# Patient Record
Sex: FEMALE | Race: WHITE | Employment: UNEMPLOYED | URBAN - METROPOLITAN AREA
[De-identification: names, ages, dates, MRNs, and addresses within clinical notes are randomized per-mention and may not be internally consistent; named-entity substitution may affect disease eponyms.]

---

## 2017-01-26 ENCOUNTER — HOSPITAL ENCOUNTER (EMERGENCY)
Age: 32
Discharge: HOME | End: 2017-01-26
Attending: EMERGENCY MEDICINE

## 2017-01-26 VITALS
BODY MASS INDEX: 25.27 KG/M2 | DIASTOLIC BLOOD PRESSURE: 70 MMHG | RESPIRATION RATE: 18 BRPM | HEART RATE: 99 BPM | HEIGHT: 64 IN | WEIGHT: 148 LBS | TEMPERATURE: 98.2 F | SYSTOLIC BLOOD PRESSURE: 118 MMHG | OXYGEN SATURATION: 98 %

## 2017-01-26 DIAGNOSIS — M54.2 NECK PAIN: Primary | ICD-10-CM

## 2017-01-26 DIAGNOSIS — F41.1 ANXIETY STATE: ICD-10-CM

## 2017-01-26 LAB
ATRIAL RATE: 78 BPM
CALCULATED P AXIS, ECG09: 40 DEGREES
CALCULATED R AXIS, ECG10: 35 DEGREES
CALCULATED T AXIS, ECG11: 51 DEGREES
DIAGNOSIS, 93000: NORMAL
P-R INTERVAL, ECG05: 144 MS
Q-T INTERVAL, ECG07: 370 MS
QRS DURATION, ECG06: 82 MS
QTC CALCULATION (BEZET), ECG08: 421 MS
VENTRICULAR RATE, ECG03: 78 BPM

## 2017-01-26 RX ORDER — DICLOFENAC POTASSIUM 50 MG/1
50 TABLET, FILM COATED ORAL 3 TIMES DAILY
Qty: 50 TAB | Refills: 0 | Status: SHIPPED | OUTPATIENT
Start: 2017-01-26 | End: 2017-03-24 | Stop reason: ALTCHOICE

## 2017-01-26 RX ORDER — METHOCARBAMOL 500 MG/1
500 TABLET, FILM COATED ORAL 3 TIMES DAILY
Qty: 20 TAB | Refills: 0 | Status: SHIPPED | OUTPATIENT
Start: 2017-01-26 | End: 2017-03-24 | Stop reason: ALTCHOICE

## 2017-01-26 NOTE — UC PROVIDER NOTE
HPI Comments: 37 yo female presenting to the [facility] today with C/O ongoing chest (upper) discomfort since [place]. Today she reports right side neck pain and discomfort. Denies SOB or diaphoresis. Reports increased stress as both children are sick for the past month. Patient is anxious and tearful at times. + hx of GERD. Has been seen in the ER x 1, cardiology x 1 and GI all in the last 6 weeks. Patient is a 36 y.o. female presenting with chest pain. The history is provided by the patient. No  was used. Chest Pain    This is a chronic problem. The current episode started more than 1 week ago. The pain is at a severity of 6/10. Past Medical History   Diagnosis Date    Anemia NEC      not during pregnancy    Gastrointestinal disorder      abdominal pain    Heart abnormalities      svt    HX OTHER MEDICAL      Left shoulder abscess 11/8/12    HX OTHER MEDICAL      tachycardia SVT    HX OTHER MEDICAL      MRSA Nov 12, left shoulder.     HX OTHER MEDICAL      LGA    Migraine     Other ill-defined conditions(799.89)      rapid heart rate     SVT (supraventricular tachycardia) 9/16/2014    Thyroid disease      mild hypothyroidism        Past Surgical History   Procedure Laterality Date    Hx cholecystectomy      Hx other surgical       D&C    Hx other surgical       lap arsen March 10    Upper gi endoscopy,biopsy  11/24/2015          Colonoscopy,biopsy  5/26/2016          Colonoscopy N/A 5/26/2016     COLONOSCOPY / BIOPSY  performed by Erik Opitz, MD at [facility] ENDOSCOPY         Family History   Problem Relation Age of Onset    Asthma Mother     SLE Mother     Migraines Father     No Known Problems Brother     No Known Problems Brother     No Known Problems Brother     No Known Problems Daughter     No Known Problems Son     Cancer Paternal Aunt     Stroke Maternal Grandmother     Diabetes Paternal Grandfather         Social History     Social History    Marital status:      Spouse name: N/A    Number of children: N/A    Years of education: N/A     Occupational History    Not on file. Social History Main Topics    Smoking status: Never Smoker    Smokeless tobacco: Never Used    Alcohol use 0.0 oz/week     0 Standard drinks or equivalent per week      Comment: social    Drug use: No    Sexual activity: Yes     Birth control/ protection: None     Other Topics Concern    Not on file     Social History Narrative    ** Merged History Encounter **                     ALLERGIES: Latex; Latex; and Morphine    Review of Systems   Constitutional: Negative. HENT: Negative. Eyes: Negative. Respiratory: Negative. Cardiovascular: Positive for chest pain. Gastrointestinal: Negative. Endocrine: Negative. Genitourinary: Negative. Musculoskeletal: Positive for neck pain. Skin: Negative. Allergic/Immunologic: Negative. Neurological: Negative. Hematological: Negative. Psychiatric/Behavioral: Negative. Vitals:    01/26/17 1045   BP: 118/70   Pulse: 99   Resp: 18   Temp: 98.2 °F (36.8 °C)   SpO2: 98%   Weight: 67.1 kg (148 lb)   Height: 5' 4\" (1.626 m)       Physical Exam   Constitutional: She is oriented to person, place, and time. She appears well-developed and well-nourished. HENT:   Head: Normocephalic and atraumatic. Right Ear: External ear normal.   Left Ear: External ear normal.   Nose: Nose normal.   Mouth/Throat: Oropharynx is clear and moist.   Eyes: Conjunctivae and EOM are normal. Pupils are equal, round, and reactive to light. Neck: Normal range of motion. Neck supple. Cardiovascular: Normal rate, regular rhythm and normal heart sounds. Pulmonary/Chest: Effort normal and breath sounds normal.   Musculoskeletal: Normal range of motion. She exhibits tenderness. She exhibits no edema. + tenderness to right trapezius  No bony crepitus  No contusions     Lymphadenopathy:     She has no cervical adenopathy. Neurological: She is alert and oriented to person, place, and time. Skin: Skin is warm and dry. Psychiatric: Judgment and thought content normal.   + anxiety   Nursing note and vitals reviewed. MDM     Differential Diagnosis; Clinical Impression; Plan:     CLINICAL IMPRESSION:  Neck pain  (primary encounter diagnosis)  Anxiety state    Plan:  1. Neck pain- try the Robaxin as this is a muscle relaxer  2. Anxiety- consider discussing this with your PCP  3. Risk of Significant Complications, Morbidity, and/or Mortality:   Presenting problems: Moderate  Diagnostic procedures:   Moderate  Progress:   Patient progress:  Stable      EKG  Date/Time: 1/26/2017 10:54 AM  Performed by: Anders Davalos by: Diana Banerjee     Previous ECG:     Previous ECG:  Compared to current    Similarity:  No change  Interpretation:     Interpretation: normal    Rate:     ECG rate:  78    ECG rate assessment: normal    Rhythm:     Rhythm: sinus rhythm    Ectopy:     Ectopy: none    QRS:     QRS axis:  Normal  Conduction:     Conduction: normal    ST segments:     ST segments:  Normal  T waves:     T waves: normal

## 2017-01-26 NOTE — DISCHARGE INSTRUCTIONS
Neck Pain: Care Instructions  Your Care Instructions  You can have neck pain anywhere from the bottom of your head to the top of your shoulders. It can spread to the upper back or arms. Injuries, painting a ceiling, sleeping with your neck twisted, staying in one position for too long, and many other activities can cause neck pain. Most neck pain gets better with home care. Your doctor may recommend medicine to relieve pain or relax your muscles. He or she may suggest exercise and physical therapy to increase flexibility and relieve stress. You may need to wear a special (cervical) collar to support your neck for a day or two. Follow-up care is a key part of your treatment and safety. Be sure to make and go to all appointments, and call your doctor if you are having problems. It's also a good idea to know your test results and keep a list of the medicines you take. How can you care for yourself at home? · Try using a heating pad on a low or medium setting for 15 to 20 minutes every 2 or 3 hours. Try a warm shower in place of one session with the heating pad. · You can also try an ice pack for 10 to 15 minutes every 2 to 3 hours. Put a thin cloth between the ice and your skin. · Take pain medicines exactly as directed. ¨ If the doctor gave you a prescription medicine for pain, take it as prescribed. ¨ If you are not taking a prescription pain medicine, ask your doctor if you can take an over-the-counter medicine. · If your doctor recommends a cervical collar, wear it exactly as directed. When should you call for help? Call your doctor now or seek immediate medical care if:  · You have new or worsening numbness in your arms, buttocks or legs. · You have new or worsening weakness in your arms or legs. (This could make it hard to stand up.)  · You lose control of your bladder or bowels.   Watch closely for changes in your health, and be sure to contact your doctor if:  · Your neck pain is getting worse.  · You are not getting better after 1 week. · You do not get better as expected. Where can you learn more? Go to http://sonal-jose elias.info/. Enter [id number] in the search box to learn more about \"Neck Pain: Care Instructions. \"  Current as of: May 23, 2016  Content Version: 11.1  © 1950-1193 [employer]. Care instructions adapted under license by [employer] (which disclaims liability or warranty for this information). If you have questions about a medical condition or this instruction, always ask your healthcare professional. [place] [address] any warranty or liability for your use of this information.

## 2017-03-24 ENCOUNTER — OFFICE VISIT (OUTPATIENT)
Dept: PRIMARY CARE CLINIC | Age: 32
End: 2017-03-24

## 2017-03-24 VITALS
HEIGHT: 64 IN | SYSTOLIC BLOOD PRESSURE: 118 MMHG | WEIGHT: 146 LBS | TEMPERATURE: 98.5 F | OXYGEN SATURATION: 98 % | RESPIRATION RATE: 16 BRPM | DIASTOLIC BLOOD PRESSURE: 80 MMHG | HEART RATE: 101 BPM | BODY MASS INDEX: 24.92 KG/M2

## 2017-03-24 DIAGNOSIS — B34.9 ACUTE VIRAL SYNDROME: Primary | ICD-10-CM

## 2017-03-24 PROBLEM — L30.9 ECZEMA: Status: ACTIVE | Noted: 2017-03-24

## 2017-03-24 LAB
QUICKVUE INFLUENZA TEST: NEGATIVE
S PYO AG THROAT QL: NEGATIVE
VALID INTERNAL CONTROL?: YES
VALID INTERNAL CONTROL?: YES

## 2017-03-24 RX ORDER — PAROXETINE HYDROCHLORIDE 20 MG/1
TABLET, FILM COATED ORAL
Refills: 11 | COMMUNITY
Start: 2017-01-30 | End: 2017-03-24 | Stop reason: ALTCHOICE

## 2017-03-24 RX ORDER — LAMOTRIGINE 100 MG/1
TABLET ORAL
Refills: 3 | COMMUNITY
Start: 2017-01-19

## 2017-03-24 RX ORDER — DICYCLOMINE HYDROCHLORIDE 20 MG/1
TABLET ORAL
Refills: 5 | COMMUNITY
Start: 2017-03-06 | End: 2017-05-08

## 2017-03-24 RX ORDER — HYDROCODONE POLISTIREX AND CHLORPHENIRAMINE POLISTIREX 10; 8 MG/5ML; MG/5ML
1 SUSPENSION, EXTENDED RELEASE ORAL
Qty: 120 ML | Refills: 0 | Status: SHIPPED | OUTPATIENT
Start: 2017-03-24 | End: 2017-05-08

## 2017-03-24 RX ORDER — CLONAZEPAM 0.5 MG/1
TABLET ORAL
Refills: 0 | COMMUNITY
Start: 2017-01-30 | End: 2017-03-24 | Stop reason: ALTCHOICE

## 2017-03-24 RX ORDER — DIAZEPAM 2 MG/1
TABLET ORAL
Refills: 0 | COMMUNITY
Start: 2016-12-26 | End: 2017-03-24 | Stop reason: ALTCHOICE

## 2017-03-24 RX ORDER — CEPHALEXIN 500 MG/1
CAPSULE ORAL
Refills: 1 | COMMUNITY
Start: 2017-02-01 | End: 2017-03-24 | Stop reason: ALTCHOICE

## 2017-03-24 NOTE — PATIENT INSTRUCTIONS
Rest, force fluids, take the cough & congestion medicine regularly twice a day as directed. You may also take Tylenol &/or Motrin for the body aches & general ill feeling. Viral Infections: Care Instructions  Your Care Instructions  You don't feel well, but it's not clear what's causing it. You may have a viral infection. Viruses cause many illnesses, such as the common cold, influenza, fever, rashes, and the diarrhea, nausea, and vomiting that are often called \"stomach flu. \" You may wonder if antibiotic medicines could make you feel better. But antibiotics only treat infections caused by bacteria. They don't work on viruses. The good news is that viral infections usually aren't serious. Most will go away in a few days without medical treatment. In the meantime, there are a few things you can do to make yourself more comfortable. Follow-up care is a key part of your treatment and safety. Be sure to make and go to all appointments, and call your doctor if you are having problems. It's also a good idea to know your test results and keep a list of the medicines you take. How can you care for yourself at home? · Get plenty of rest if you feel tired. · Take an over-the-counter pain medicine if needed, such as acetaminophen (Tylenol), ibuprofen (Advil, Motrin), or naproxen (Aleve). Read and follow all instructions on the label. · Be careful when taking over-the-counter cold or flu medicines and Tylenol at the same time. Many of these medicines have acetaminophen, which is Tylenol. Read the labels to make sure that you are not taking more than the recommended dose. Too much acetaminophen (Tylenol) can be harmful. · Drink plenty of fluids, enough so that your urine is light yellow or clear like water. If you have kidney, heart, or liver disease and have to limit fluids, talk with your doctor before you increase the amount of fluids you drink.   · Stay home from work, school, and other public places while you have a fever. When should you call for help? Call 911 anytime you think you may need emergency care. For example, call if:  · You have severe trouble breathing. · You passed out (lost consciousness). Call your doctor now or seek immediate medical care if:  · You seem to be getting much sicker. · You have a new or higher fever. · You have blood in your stools. · You have new belly pain, or your pain gets worse. · You have a new rash. Watch closely for changes in your health, and be sure to contact your doctor if:  · You start to get better and then get worse. · You do not get better as expected. Where can you learn more? Go to http://sonal-jose elias.info/. Enter G233 in the search box to learn more about \"Viral Infections: Care Instructions. \"  Current as of: May 24, 2016  Content Version: 11.1  © 2383-3449 [employer]. Care instructions adapted under license by [employer] (which disclaims liability or warranty for this information). If you have questions about a medical condition or this instruction, always ask your healthcare professional. [place] [address] any warranty or liability for your use of this information.

## 2017-03-24 NOTE — MR AVS SNAPSHOT
Visit Information Date & Time Provider Department Dept. Phone Encounter #  
 3/24/2017  9:30 AM Helen Gill, [address] [id number] Follow-up Instructions Return if symptoms worsen or fail to improve. Upcoming Health Maintenance Date Due DTaP/Tdap/Td series (1 - Tdap) 9/12/2008 PAP AKA CERVICAL CYTOLOGY 11/11/2014 INFLUENZA AGE 9 TO ADULT 8/1/2016 Allergies as of 3/24/2017  Review Complete On: 3/24/2017 By: Helen Gill MD  
  
 Severity Noted Reaction Type Reactions Latex  12/29/2010    Itching Latex  10/14/2011   Intolerance Rash, Itching Morphine  12/29/2010    Other (comments)  
 abd pain, nausea Current Immunizations  Reviewed on 3/4/2016 Name Date Influenza Vaccine 10/1/2014 Influenza Vaccine Split 10/1/2012 Not reviewed this visit You Were Diagnosed With   
  
 Codes Comments Acute viral syndrome    -  Primary ICD-10-CM: B34.9 ICD-9-CM: 079.99 Vitals BP Pulse Temp Resp Height(growth percentile) Weight(growth percentile) 118/80 (BP 1 Location: Left arm, BP Patient Position: Sitting) (!) 101 98.5 °F (36.9 °C) (Oral) 16 5' 4\" (1.626 m) 146 lb (66.2 kg) LMP SpO2 Breastfeeding? BMI OB Status Smoking Status 03/07/2017 98% No 25.06 kg/m2 Having regular periods Never Smoker BMI and BSA Data Body Mass Index Body Surface Area 25.06 kg/m 2 1.73 m 2 Preferred Pharmacy Pharmacy Name Phone [id number] [address] [phone] Your Updated Medication List  
  
   
This list is accurate as of: 3/24/17 10:14 AM.  Always use your most recent med list.  
  
  
  
  
 chlorpheniramine-HYDROcodone 10-8 mg/5 mL suspension Commonly known as:  Que Mckinney Take 5 mL by mouth every twelve (12) hours as needed for Cough. Max Daily Amount: 10 mL.  Indications: COUGH, Nasal Congestion, RHINORRHEA  
  
 colestipol 1 gram tablet Commonly known as:  COLESTID * dicyclomine 10 mg capsule Commonly known as:  BENTYL * dicyclomine 20 mg tablet Commonly known as:  BENTYL TAKE 1 TABLET BY MOUTH TWICE A DAY  
  
 lamoTRIgine 100 mg tablet Commonly known as: LaMICtal  
TAKE 1 TABLET BY MOUTH EVERY DAY Magnesium Oxide 500 mg Cap Take 500 mg by mouth daily. triamcinolone acetonide 0.1 % ointment Commonly known as:  KENALOG * Notice: This list has 2 medication(s) that are the same as other medications prescribed for you. Read the directions carefully, and ask your doctor or other care provider to review them with you. Prescriptions Printed Refills  
 chlorpheniramine-HYDROcodone (TUSSIONEX) 10-8 mg/5 mL suspension 0 Sig: Take 5 mL by mouth every twelve (12) hours as needed for Cough. Max Daily Amount: 10 mL. Indications: COUGH, Nasal Congestion, RHINORRHEA Class: Print Route: Oral  
  
We Performed the Following AMB POC RAPID INFLUENZA TEST [93877 CPT(R)] AMB POC RAPID STREP A [38422 CPT(R)] Follow-up Instructions Return if symptoms worsen or fail to improve. Patient Instructions Rest, force fluids, take the cough & congestion medicine regularly twice a day as directed. You may also take Tylenol &/or Motrin for the body aches & general ill feeling. Viral Infections: Care Instructions Your Care Instructions You don't feel well, but it's not clear what's causing it. You may have a viral infection. Viruses cause many illnesses, such as the common cold, influenza, fever, rashes, and the diarrhea, nausea, and vomiting that are often called \"stomach flu. \" You may wonder if antibiotic medicines could make you feel better. But antibiotics only treat infections caused by bacteria. They don't work on viruses. The good news is that viral infections usually aren't serious.  Most will go away in a few days without medical treatment. In the meantime, there are a few things you can do to make yourself more comfortable. Follow-up care is a key part of your treatment and safety. Be sure to make and go to all appointments, and call your doctor if you are having problems. It's also a good idea to know your test results and keep a list of the medicines you take. How can you care for yourself at home? · Get plenty of rest if you feel tired. · Take an over-the-counter pain medicine if needed, such as acetaminophen (Tylenol), ibuprofen (Advil, Motrin), or naproxen (Aleve). Read and follow all instructions on the label. · Be careful when taking over-the-counter cold or flu medicines and Tylenol at the same time. Many of these medicines have acetaminophen, which is Tylenol. Read the labels to make sure that you are not taking more than the recommended dose. Too much acetaminophen (Tylenol) can be harmful. · Drink plenty of fluids, enough so that your urine is light yellow or clear like water. If you have kidney, heart, or liver disease and have to limit fluids, talk with your doctor before you increase the amount of fluids you drink. · Stay home from work, school, and other public places while you have a fever. When should you call for help? Call 911 anytime you think you may need emergency care. For example, call if: 
· You have severe trouble breathing. · You passed out (lost consciousness). Call your doctor now or seek immediate medical care if: 
· You seem to be getting much sicker. · You have a new or higher fever. · You have blood in your stools. · You have new belly pain, or your pain gets worse. · You have a new rash. Watch closely for changes in your health, and be sure to contact your doctor if: 
· You start to get better and then get worse. · You do not get better as expected. Where can you learn more? Go to http://vamsi.info/. Enter X288 in the search box to learn more about \"Viral Infections: Care Instructions. \" Current as of: May 24, 2016 Content Version: 11.1 © 6426-2581 [employer]. Care instructions adapted under license by [employer] (which disclaims liability or warranty for this information). If you have questions about a medical condition or this instruction, always ask your healthcare professional. [address] any warranty or liability for your use of this information. Introducing [facility]! Dear Sylvia Patel: 
Thank you for requesting a Tech[employer]an[employer] account. Our records indicate that you already have an active TechLoaner account. You can access your account anytime at [email or web address] Did you know that you can access your hospital and ER discharge instructions at any time in TechLoaner? You can also review all of your test results from your hospital stay or ER visit. Additional Information If you have questions, please visit the Frequently Asked Questions section of the Tech[employer]aner website at [email or web address] Remember, TechLoaner is NOT to be used for urgent needs. For medical emergencies, dial 911. Now available from your iPhone and Android! Please provide this summary of care documentation to your next provider. Your primary care clinician is listed as [address]. If you have any questions after today's visit, please call [phone].

## 2017-03-24 NOTE — PROGRESS NOTES
Chief Complaint   Patient presents with    Generalized Body Aches    Nasal Discharge    Diarrhea    Sore Throat   symptoms started yesterday, has taken Benadryl to help with discomfort

## 2017-03-24 NOTE — PROGRESS NOTES
Chief Complaint   Patient presents with    Generalized Body Aches    Nasal Discharge    Diarrhea    Sore Throat       HPI:  36year old female who presents with a two day history of cough, sore throat & runny nose, which she thought might have been allergies, took Benadryl, but the next morning had body aches, nausea, diarrhea. No actual vomiting. No fevers or chills. Did get a flu shot in October. Review of Systems - has had recent weight loss - 10 lbs due to \"stomach issues\". No fevers, chills, shortness of breath, urinary frequency/urgency/dysuria. No new skin changes. Otherwise, ROS negative except as per HPI    Past Medical History:   Diagnosis Date    Anemia NEC     not during pregnancy    Eczema 3/24/2017    Gastrointestinal disorder     abdominal pain    Heart abnormalities     svt    HX OTHER MEDICAL     Left shoulder abscess 11/8/12    HX OTHER MEDICAL     tachycardia SVT    HX OTHER MEDICAL     MRSA Nov 12, left shoulder.     HX OTHER MEDICAL     LGA    Migraine     Other ill-defined conditions(799.89)     rapid heart rate     SVT (supraventricular tachycardia) 9/16/2014    Thyroid disease     mild hypothyroidism       Past Surgical History:   Procedure Laterality Date    COLONOSCOPY N/A 5/26/2016    COLONOSCOPY / BIOPSY  performed by [facility]cal [facility] at [facility]  5/26/2016         HX CHOLECYSTECTOMY      HX OTHER SURGICAL      D&C    HX OTHER SURGICAL      lap arsen March 10    UPPER GI ENDOSCOPY,BIOPSY  11/24/2015            Family History   Problem Relation Age of Onset    Asthma Mother     SLE Mother    Hal Awe Migraines Father     Asthma Brother     Other Brother      autism    No Known Problems Brother     No Known Problems Daughter     No Known Problems Son     Cancer Paternal Aunt     Stroke Maternal Grandmother     Diabetes Paternal Grandfather     Heart Disease Paternal Grandfather     Heart Disease Maternal Grandfather     Cancer Maternal Grandfather      lung cancer       Social History     Social History    Marital status:      Spouse name: N/A    Number of children: N/A    Years of education: N/A     Occupational History    Not on file. Social History Main Topics    Smoking status: Never Smoker    Smokeless tobacco: Never Used    Alcohol use 0.0 oz/week     0 Standard drinks or equivalent per week      Comment: social    Drug use: No    Sexual activity: Yes     Birth control/ protection: None     Other Topics Concern    Not on file     Social History Narrative    ** Merged History Encounter **            Current Outpatient Prescriptions on File Prior to Visit   Medication Sig Dispense Refill    triamcinolone acetonide (KENALOG) 0.1 % ointment   1    colestipol (COLESTID) 1 gram tablet   0    dicyclomine (BENTYL) 10 mg capsule   5    Magnesium Oxide 500 mg cap Take 500 mg by mouth daily. No current facility-administered medications on file prior to visit. Allergies   Allergen Reactions    Latex Itching    Latex Rash and Itching    Morphine Other (comments)     abd pain, nausea       PE:    General:  Well-developed, well-nourished  - in no apparent distress  HEENT:  Normocephalic, atraumatic, Pupils are equal, round, & reactive to light & accommodation. Extraocular movements intact. TM's normal, external auditory exam normal.  Oropharynx grossly normal.  No tonsillar enlargement, erythema, or exudates. Neck:  Supple, nontender, full ROM. No lymphadenopathy. No thyromegaly. Chest:  clear to auscultation without rales, rhonchi, or wheezes. CV:  Regular rate & rhythm without murmurs, gallops, clicks, or rubs. Abdomen:  soft, nontender, nondistended, normoactive bowel sounds, no organomegaly. Extremities:  No edema, clubbing, or cyanosis. Full ROM, nontender.        Orders Placed This Encounter    AMB POC RAPID STREP A    AMB POC RAPID INFLUENZA TEST    DISCONTD: cephALEXin (Clint Pamler) 500 mg capsule     Sig: TAKE ONE CAPSULE BY MOUTH 3 TIMES A DAY UNTIL GONE     Refill:  1    DISCONTD: clonazePAM (KLONOPIN) 0.5 mg tablet     Si/2-1 TABLET BY MOUTH TWICE A DAY AS NEEDED     Refill:  0    DISCONTD: diazePAM (VALIUM) 2 mg tablet     Sig: TAKE 1 TABLET BY MOUTH EVERY 8 HOURS AS NEEDED     Refill:  0    dicyclomine (BENTYL) 20 mg tablet     Sig: TAKE 1 TABLET BY MOUTH TWICE A DAY     Refill:  5    lamoTRIgine (LAMICTAL) 100 mg tablet     Sig: TAKE 1 TABLET BY MOUTH EVERY DAY     Refill:  3    DISCONTD: PARoxetine (PAXIL) 20 mg tablet     Sig: TAKE 1 TABLET BY MOUTH DAILY     Refill:  11    chlorpheniramine-HYDROcodone (TUSSIONEX) 10-8 mg/5 mL suspension     Sig: Take 5 mL by mouth every twelve (12) hours as needed for Cough. Max Daily Amount: 10 mL. Indications: COUGH, Nasal Congestion, RHINORRHEA     Dispense:  120 mL     Refill:  0       1. Acute viral syndrome    - AMB POC RAPID STREP A  - AMB POC RAPID INFLUENZA TEST  - chlorpheniramine-HYDROcodone (TUSSIONEX) 10-8 mg/5 mL suspension; Take 5 mL by mouth every twelve (12) hours as needed for Cough. Max Daily Amount: 10 mL. Indications: COUGH, Nasal Congestion, RHINORRHEA  Dispense: 120 mL; Refill: 0    Follow-up Disposition:  Return if symptoms worsen or fail to improve.     Elise Prader, MD

## 2017-05-08 ENCOUNTER — APPOINTMENT (OUTPATIENT)
Dept: GENERAL RADIOLOGY | Age: 32
End: 2017-05-08
Attending: NURSE PRACTITIONER

## 2017-05-08 ENCOUNTER — HOSPITAL ENCOUNTER (EMERGENCY)
Age: 32
Discharge: HOME | End: 2017-05-08
Attending: FAMILY MEDICINE

## 2017-05-08 VITALS
HEART RATE: 93 BPM | OXYGEN SATURATION: 100 % | HEIGHT: 65 IN | BODY MASS INDEX: 24.36 KG/M2 | TEMPERATURE: 98.3 F | SYSTOLIC BLOOD PRESSURE: 128 MMHG | RESPIRATION RATE: 18 BRPM | WEIGHT: 146.2 LBS | DIASTOLIC BLOOD PRESSURE: 80 MMHG

## 2017-05-08 DIAGNOSIS — R10.9 LEFT FLANK PAIN: Primary | ICD-10-CM

## 2017-05-08 LAB
BILIRUB UR QL: NEGATIVE
GLUCOSE UR QL STRIP.AUTO: NEGATIVE MG/DL
KETONES UR-MCNC: NEGATIVE MG/DL
LEUKOCYTE ESTERASE UR QL STRIP: NEGATIVE
NITRITE UR QL: NEGATIVE
PH UR: 7 [PH] (ref 5–8)
PROT UR QL: NEGATIVE MG/DL
RBC # UR STRIP: NEGATIVE /UL
SP GR UR: 1.01 (ref 1–1.03)
UROBILINOGEN UR QL: 0.2 EU/DL (ref 0.2–1)

## 2017-05-08 RX ORDER — FLUTICASONE PROPIONATE 50 MCG
2 SPRAY, SUSPENSION (ML) NASAL DAILY
COMMUNITY
End: 2017-07-14 | Stop reason: ALTCHOICE

## 2017-05-08 RX ORDER — CALC/MAG/B COMPLEX/D3/HERB 61
15 TABLET ORAL
COMMUNITY
End: 2017-07-14 | Stop reason: ALTCHOICE

## 2017-05-08 NOTE — UC PROVIDER NOTE
Patient is a 36 y.o. female presenting with flank pain. The history is provided by the patient. No  was used. Flank Pain    This is a new problem. Episode onset: 2 weeks. The problem has not changed since onset. The problem occurs constantly. Patient reports not work related injury. The pain is associated with no known injury. The quality of the pain is described as aching. The pain does not radiate. The pain is at a severity of 6/10. The pain is moderate. Exacerbated by: Nothing. Associated symptoms include abdominal pain. Pertinent negatives include no chest pain, no fever, no bowel incontinence, no paresthesias and no paresis. She has tried nothing for the symptoms. The treatment provided no relief. Risk factors: Hx of \"GI problems\"         Past Medical History:   Diagnosis Date    Anemia NEC     not during pregnancy    Eczema 3/24/2017    Gastrointestinal disorder     abdominal pain    Heart abnormalities     svt    HX OTHER MEDICAL     Left shoulder abscess 11/8/12    HX OTHER MEDICAL     tachycardia SVT    HX OTHER MEDICAL     MRSA Nov 12, left shoulder.     HX OTHER MEDICAL     LGA    Migraine     Other ill-defined conditions     rapid heart rate     SVT (supraventricular tachycardia) ([facility] [address] 9/16/2014    Thyroid disease     mild hypothyroidism        Past Surgical History:   Procedure Laterality Date    COLONOSCOPY N/A 5/26/2016    COLONOSCOPY / BIOPSY  performed by Americo [facility] at [facility]  5/26/2016         HX CHOLECYSTECTOMY      HX OTHER SURGICAL      D&C    HX OTHER SURGICAL      lap arsen March 10    UPPER GI ENDOSCOPY,BIOPSY  11/24/2015              Family History   Problem Relation Age of Onset    Asthma Mother     SLE Mother    [place] Migraines Father     Asthma Brother     Other Brother      autism    No Known Problems Brother     No Known Problems Daughter     No Known Problems Son     Cancer Paternal Anastasia Melter     Stroke Maternal Grandmother     Diabetes Paternal Grandfather     Heart Disease Paternal Grandfather     Heart Disease Maternal Grandfather     Cancer Maternal Grandfather      lung cancer        Social History     Social History    Marital status:      Spouse name: N/A    Number of children: N/A    Years of education: N/A     Occupational History    Not on file. Social History Main Topics    Smoking status: Never Smoker    Smokeless tobacco: Never Used    Alcohol use 0.0 oz/week     0 Standard drinks or equivalent per week      Comment: social    Drug use: No    Sexual activity: Yes     Birth control/ protection: None     Other Topics Concern    Not on file     Social History Narrative    ** Merged History Encounter **                     ALLERGIES: Latex; Latex; and Morphine    Review of Systems   Constitutional: Negative. Negative for fever. HENT: Negative. Eyes: Negative. Respiratory: Negative. Cardiovascular: Negative. Negative for chest pain. Gastrointestinal: Positive for abdominal pain. Negative for bowel incontinence. Endocrine: Negative. Genitourinary: Positive for flank pain. Skin: Negative. Allergic/Immunologic: Negative. Neurological: Negative. Negative for paresthesias. Hematological: Negative. Psychiatric/Behavioral: Negative. Vitals:    05/08/17 1839   BP: 128/80   Pulse: 93   Resp: 18   Temp: 98.3 °F (36.8 °C)   SpO2: 100%   Weight: 66.3 kg (146 lb 3.2 oz)   Height: 5' 5\" (1.651 m)       Physical Exam   Constitutional: She is oriented to person, place, and time. She appears well-developed and well-nourished. HENT:   Head: Normocephalic and atraumatic. Right Ear: External ear normal.   Left Ear: External ear normal.   Nose: Nose normal.   Mouth/Throat: Oropharynx is clear and moist.   Eyes: Conjunctivae and EOM are normal. Pupils are equal, round, and reactive to light. Neck: Normal range of motion. Neck supple.    Cardiovascular: Normal rate, regular rhythm and normal heart sounds. Pulmonary/Chest: Effort normal and breath sounds normal.   Abdominal: Soft. Bowel sounds are normal. There is tenderness.   + LUQ tenderness  No distention  No CVAT     Musculoskeletal: Normal range of motion. Neurological: She is alert and oriented to person, place, and time. Skin: Skin is warm and dry. Psychiatric: She has a normal mood and affect. Her behavior is normal. Judgment and thought content normal.   Nursing note and vitals reviewed. MDM     Differential Diagnosis; Clinical Impression; Plan:     CLINICAL IMPRESSION:  Left flank pain  (primary encounter diagnosis)    Plan:  1. Left flank pain. 2. Xray and urine both look good today. If you continue to have pain, please follow up with PCP or go to the ER of choice. 3.   Amount and/or Complexity of Data Reviewed:   Clinical lab tests:  Ordered and reviewed  Tests in the radiology section of CPT®:  Ordered and reviewed  Risk of Significant Complications, Morbidity, and/or Mortality:   Presenting problems: Moderate  Diagnostic procedures:   Moderate  Progress:   Patient progress:  Stable      Procedures

## 2017-05-09 ENCOUNTER — PATIENT OUTREACH (OUTPATIENT)
Dept: OTHER | Age: 32
End: 2017-05-09

## 2017-05-09 NOTE — PROGRESS NOTES
Patient on discharge report dated 5/9. Left message on voicemail. Will attempt to contact again. Need to complete post-discharge assessment.

## 2017-05-10 ENCOUNTER — PATIENT OUTREACH (OUTPATIENT)
Dept: OTHER | Age: 32
End: 2017-05-10

## 2017-05-10 NOTE — PROGRESS NOTES
Transition Of Care Note    Patient discharged from [facility] for L Flank pain. Medical History:     Past Medical History:   Diagnosis Date    Anemia NEC     not during pregnancy    Eczema 3/24/2017    Gastrointestinal disorder     abdominal pain    Heart abnormalities     svt    HX OTHER MEDICAL     Left shoulder abscess 12    HX OTHER MEDICAL     tachycardia SVT    HX OTHER MEDICAL     MRSA , left shoulder.  HX OTHER MEDICAL     LGA    Migraine     Other ill-defined conditions     rapid heart rate     SVT (supraventricular tachycardia) ([facility] [address] 2014    Thyroid disease     mild hypothyroidism       Care Manager contacted the patient by telephone to perform post UC) discharge assessment. Verified  and zip code with patient as identifiers. Provided introduction to self, and explanation of the Nurse Care Manager role. Medication:   Performed medication reconciliation with patient, and patient verbalizes understanding of administration of home medications. There were no barriers to obtaining medications identified at this time. Support system:  patient    Discharge Instructions :  Reviewed discharge instructions with patient. Patient verbalizes understanding of discharge instructions and follow-up care. Red Flags:  Blood in urine, fever, increasing pain    Advance Care Planning:   Patient was offered the opportunity to discuss advance care planning:  no     Does patient have an Advance Directive:  no   If no, did you provide information on Caring Connections?  no     PCP/Specialist follow up: Patient does not yet have scheduled to follow up with Isai Bishop MD. This CM highly encouraged patient to schedule follow-up and offered assistance. Also educated patient on Employee clinics. Reviewed red flags with patient, and patient verbalizes understanding. Patient given an opportunity to ask questions. No other clinical/social/functional needs noted. The patient agrees to contact the PCP office for questions related to their healthcare. The patient expressed thanks, offered no additional questions and ended the call.

## 2017-05-18 ENCOUNTER — HOSPITAL ENCOUNTER (OUTPATIENT)
Dept: ULTRASOUND IMAGING | Age: 32
Discharge: HOME | End: 2017-05-18
Attending: FAMILY MEDICINE
Payer: COMMERCIAL

## 2017-05-18 DIAGNOSIS — E01.0 THYROMEGALY: ICD-10-CM

## 2017-05-18 PROCEDURE — 76536 US EXAM OF HEAD AND NECK: CPT

## 2017-05-30 ENCOUNTER — PATIENT OUTREACH (OUTPATIENT)
Dept: OTHER | Age: 32
End: 2017-05-30

## 2017-05-30 NOTE — PROGRESS NOTES
Follow up call to patient, two pt identifier verified. Pt states pain has resolved, states that she feels \"something passed, because the pain went down and then it was better. \" PT had follow-up with Dr. Vj Taylor, no further concerns at this time.  This CM will continue to monitor for 30 day COLEMAN program.

## 2017-06-16 ENCOUNTER — HOSPITAL ENCOUNTER (OUTPATIENT)
Dept: GENERAL RADIOLOGY | Age: 32
Discharge: HOME | End: 2017-06-16
Payer: COMMERCIAL

## 2017-06-16 ENCOUNTER — DOCUMENTATION ONLY (OUTPATIENT)
Dept: OTHER | Age: 32
End: 2017-06-16

## 2017-06-16 DIAGNOSIS — R07.9 CHEST PAIN: ICD-10-CM

## 2017-06-16 PROCEDURE — 71020 XR CHEST PA LAT: CPT

## 2017-06-16 NOTE — PROGRESS NOTES
Resolving current episode Transitions of care complete. No further ED/UC or hospital admissions within 30 days post discharge. Patient attended follow-up appointments as directed. No outreach from patient to [address] [place] [address].

## 2017-07-14 ENCOUNTER — OFFICE VISIT (OUTPATIENT)
Dept: PRIMARY CARE CLINIC | Age: 32
End: 2017-07-14

## 2017-07-14 VITALS
DIASTOLIC BLOOD PRESSURE: 79 MMHG | TEMPERATURE: 98.7 F | HEART RATE: 93 BPM | HEIGHT: 65 IN | WEIGHT: 144 LBS | RESPIRATION RATE: 16 BRPM | BODY MASS INDEX: 23.99 KG/M2 | OXYGEN SATURATION: 98 % | SYSTOLIC BLOOD PRESSURE: 125 MMHG

## 2017-07-14 DIAGNOSIS — L20.84 INTRINSIC ECZEMA: ICD-10-CM

## 2017-07-14 DIAGNOSIS — L03.119 CELLULITIS OF UPPER EXTREMITY, UNSPECIFIED LATERALITY: Primary | ICD-10-CM

## 2017-07-14 DIAGNOSIS — L30.1 CHRONIC VESICULAR HAND DERMATITIS: ICD-10-CM

## 2017-07-14 DIAGNOSIS — J01.00 ACUTE MAXILLARY SINUSITIS, RECURRENCE NOT SPECIFIED: ICD-10-CM

## 2017-07-14 PROBLEM — G89.29 CHRONIC ABDOMINAL PAIN: Status: ACTIVE | Noted: 2017-07-14

## 2017-07-14 PROBLEM — G43.909 MIGRAINE SYNDROME: Status: ACTIVE | Noted: 2017-07-14

## 2017-07-14 PROBLEM — R10.9 CHRONIC ABDOMINAL PAIN: Status: ACTIVE | Noted: 2017-07-14

## 2017-07-14 RX ORDER — ESCITALOPRAM OXALATE 10 MG/1
TABLET ORAL
Refills: 6 | COMMUNITY
Start: 2017-05-15 | End: 2017-07-14 | Stop reason: ALTCHOICE

## 2017-07-14 RX ORDER — DOXYCYCLINE 100 MG/1
100 CAPSULE ORAL 2 TIMES DAILY
Qty: 14 CAP | Refills: 1 | Status: SHIPPED | OUTPATIENT
Start: 2017-07-14 | End: 2017-07-21

## 2017-07-14 RX ORDER — MUPIROCIN CALCIUM 20 MG/G
CREAM TOPICAL 2 TIMES DAILY
Qty: 30 G | Refills: 2 | Status: SHIPPED | OUTPATIENT
Start: 2017-07-14

## 2017-07-14 RX ORDER — DICYCLOMINE HYDROCHLORIDE 20 MG/1
TABLET ORAL
Refills: 5 | COMMUNITY
Start: 2017-05-09 | End: 2017-07-14 | Stop reason: ALTCHOICE

## 2017-07-14 NOTE — MR AVS SNAPSHOT
Visit Information Date & Time Provider Department Dept. Phone Encounter #  
 7/14/2017 12:30 PM Adrienne Ruvalcaba, [address] [place] [id number] Follow-up Instructions Return if symptoms worsen or fail to improve. Upcoming Health Maintenance Date Due DTaP/Tdap/Td series (1 - Tdap) 9/12/2008 PAP AKA CERVICAL CYTOLOGY 11/11/2014 INFLUENZA AGE 9 TO ADULT 8/1/2017 Allergies as of 7/14/2017  Review Complete On: 7/14/2017 By: Adrienne Ruvalcaba NP Severity Noted Reaction Type Reactions Latex  12/29/2010    Itching Latex  10/14/2011   Intolerance Rash, Itching Morphine  12/29/2010    Other (comments)  
 abd pain, nausea Current Immunizations  Reviewed on 3/4/2016 Name Date Influenza Vaccine 10/1/2014 Influenza Vaccine Split 10/1/2012 Not reviewed this visit You Were Diagnosed With   
  
 Codes Comments Cellulitis of upper extremity, unspecified laterality    -  Primary ICD-10-CM: L03.119 ICD-9-CM: 682.3 Intrinsic eczema     ICD-10-CM: L20.84 ICD-9-CM: 691.8 Acute maxillary sinusitis, recurrence not specified     ICD-10-CM: J01.00 ICD-9-CM: 461.0 Vitals BP Pulse Temp Resp Height(growth percentile) Weight(growth percentile) 125/79 (BP 1 Location: Left arm, BP Patient Position: Sitting) 93 98.7 °F (37.1 °C) (Oral) 16 5' 5\" (1.651 m) 144 lb (65.3 kg) LMP SpO2 Breastfeeding? BMI OB Status Smoking Status 07/11/2017 98% No 23.96 kg/m2 Having regular periods Never Smoker Vitals History BMI and BSA Data Body Mass Index Body Surface Area  
 23.96 kg/m 2 1.73 m 2 Preferred Pharmacy Pharmacy Name Phone [address] [place] [address], [address] [place] [address] Pippa [address]astume Said [phone] Your Updated Medication List  
  
   
This list is accurate as of: 7/14/17  1:19 PM.  Always use your most recent med list.  
  
  
  
  
 colestipol 1 gram tablet Commonly known as:  COLESTID Take 1 g by mouth daily. dicyclomine 10 mg capsule Commonly known as:  BENTYL Take 10 mg by mouth daily. doxycycline 100 mg capsule Commonly known as:  Lian Soda Take 1 Cap by mouth two (2) times a day for 7 days. lamoTRIgine 100 mg tablet Commonly known as: LaMICtal  
TAKE 1 TABLET BY MOUTH EVERY DAY Magnesium Oxide 500 mg Cap Take 500 mg by mouth daily. mupirocin calcium 2 % topical cream  
Commonly known as:  [facility] Apply  to affected area two (2) times a day. triamcinolone acetonide 0.1 % ointment Commonly known as:  KENALOG Apply 0.1 g to affected area two (2) times a day. Prescriptions Sent to Pharmacy Refills  
 doxycycline (MONODOX) 100 mg capsule 1 Sig: Take 1 Cap by mouth two (2) times a day for 7 days. Class: Normal  
 Pharmacy: [facility]  at [address] Ph #: [phone] Route: Oral  
 mupirocin calcium (BACTROBAN) 2 % topical cream 2 Sig: Apply  to affected area two (2) times a day. Class: Normal  
 Pharmacy: [place] [id number] at [address] Ph #: [phone] Route: Topical  
  
Follow-up Instructions Return if symptoms worsen or fail to improve. Patient Instructions Cellulitis: Care Instructions Your Care Instructions Cellulitis is a skin infection. It often occurs after a break in the skin from a scrape, cut, bite, or puncture, or after a rash. The doctor has checked you carefully, but problems can develop later. If you notice any problems or new symptoms, get medical treatment right away. Follow-up care is a key part of your treatment and safety. Be sure to make and go to all appointments, and call your doctor if you are having problems. It's also a good idea to know your test results and keep a list of the medicines you take. How can you care for yourself at home? · Take your antibiotics as directed. Do not stop taking them just because you feel better. You need to take the full course of antibiotics. · Prop up the infected area on pillows to reduce pain and swelling. Try to keep the area above the level of your heart as often as you can. · If your doctor told you how to care for your wound, follow your doctor's instructions. If you did not get instructions, follow this general advice: ¨ Wash the wound with clean water 2 times a day. Don't use hydrogen peroxide or alcohol, which can slow healing. ¨ You may cover the wound with a thin layer of petroleum jelly, such as Vaseline, and a nonstick bandage. ¨ Apply more petroleum jelly and replace the bandage as needed. · Be safe with medicines. Take pain medicines exactly as directed. ¨ If the doctor gave you a prescription medicine for pain, take it as prescribed. ¨ If you are not taking a prescription pain medicine, ask your doctor if you can take an over-the-counter medicine. To prevent cellulitis in the future · Try to prevent cuts, scrapes, or other injuries to your skin. Cellulitis most often occurs where there is a break in the skin. · If you get a scrape, cut, mild burn, or bite, wash the wound with clean water as soon as you can to help avoid infection. Don't use hydrogen peroxide or alcohol, which can slow healing. · If you have swelling in your legs (edema), support stockings and good skin care may help prevent leg sores and cellulitis. · Take care of your feet, especially if you have diabetes or other conditions that increase the risk of infection. Wear shoes and socks. Do not go barefoot. If you have athlete's foot or other skin problems on your feet, talk to your doctor about how to treat them. When should you call for help? Call your doctor now or seek immediate medical care if: 
· You have signs that your infection is getting worse, such as: ¨ Increased pain, swelling, warmth, or redness. ¨ Red streaks leading from the area. ¨ Pus draining from the area. ¨ A fever. · You get a rash. Watch closely for changes in your health, and be sure to contact your doctor if: 
· You are not getting better after 1 day (24 hours). · You do not get better as expected. Where can you learn more? Go to http://sonal-jose elias.info/. Jaxon Balderas in the search box to learn more about \"Cellulitis: Care Instructions. \" Current as of: October 13, 2016 Content Version: 11.3 © 1802-0589 [employer] Care instructions adapted under license by [employer] (which disclaims liability or warranty for this information). If you have questions about a medical condition or this instruction, always ask your healthcare professional. [address] any warranty or liability for your use of this information. Introducing \[facility] [place] [facility]\"" & HEALTH SERVICES! Dear Little Moser: 
Thank you for requesting a [employer] account. Our records indicate that you already have an active [employer] account. You can access your account anytime at [email or web address] Did you know that you can access your hospital and ER discharge instructions at any time in Gekko? You can also review all of your test results from your hospital stay or ER visit. Additional Information If you have questions, please visit the Frequently Asked Questions section of the [employer] website at [email or web address] Remember, Gekko is NOT to be used for urgent needs. For medical emergencies, dial 911. Now available from your iPhone and Android! Please provide this summary of care documentation to your next provider. Your primary care clinician is listed as Cali Navarro. If you have any questions after today's visit, please call [phone].

## 2017-07-14 NOTE — PROGRESS NOTES
Subjective:   Sienna Modi is a 36 y.o. female who complains of congestion, sore throat, nasal blockage and light ear wax draining from right ear for 2 days, rapidly worsening since that time. She denies a history of shortness of breath and wheezing. She also has eczema with severe dry skin on bilateral hands and palms, which has become open, and started draining honey-crusted yellow drainage from open blisters. The open areas are on her left 3rd finger, and her right palm near her index finger and thumb. 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 She wears gloves as much as possible, and does frequent handwashing. She has to avoid using any alcohol based hand sanitizers. Evaluation to date: none. Treatment to date: OTC products. Patient does not smoke cigarettes. Relevant PMH: No pertinent additional PMH. Patient Active Problem List   Diagnosis Code    SVT (supraventricular tachycardia) ([facility]) I47.1    Murmur R01.1    False labor O47.9    Pregnant Z33.1    Labor and delivery, indication for care O75.9    Positive GBS test B95.1    Obstetric labial laceration, delivered, current hospitalization O70.0    Nipple dermatitis L30.9    Eczema L30.9     Patient Active Problem List    Diagnosis Date Noted    Eczema 03/24/2017    Nipple dermatitis 12/11/2015    Labor and delivery, indication for care 06/27/2015     Class: Present on Admission    Positive GBS test 06/27/2015     Class: Present on Admission    Obstetric labial laceration, delivered, current hospitalization 06/27/2015     Class: Acute    Pregnant 06/26/2015    False labor 06/12/2015    Murmur 10/02/2014    SVT (supraventricular tachycardia) ([facility]) 09/16/2014     Current Outpatient Prescriptions   Medication Sig Dispense Refill    doxycycline (MONODOX) 100 mg capsule Take 1 Cap by mouth two (2) times a day for 7 days.  14 Cap 1    Patient is a 88y old  Female who presents with a chief complaint of Daughter stated that patient was unresponsive at home. (02 Apr 2017 15:53)      HPI:  Pt. is a 87 y/o female w/pmhx of HTN, DM-2 (on lantus 16u qhs), Seizure d/o (not taking keppra), ESRD on HD m,w,f was in [facility] till friday while in HD pt pulled catheter and later was told there was fistula clot, went to vascular yesterday andafte had full HD, son states after HD pt felt weak. states she was just not herself and brings her in, no seizure like activity noted, no cp, palitations n/v/d/c no fever or chills. (02 Apr 2017 05:01)      INTERVAL HPI/OVERNIGHT EVENTS:  Patient had 1 soft/loose bowel movement since this AM  ( it is now 19:46 ). The patient denies melena, hematochezia, hematemesis, nausea, vomiting, abdominal pain, constipation,or change in bowel movements. she is tolerating a regular diet.     MEDICATIONS  (STANDING):  atorvastatin 40milliGRAM(s) Oral at bedtime  clopidogrel Tablet 75milliGRAM(s) Oral daily  insulin glargine Injectable (LANTUS) 6Unit(s) SubCutaneous at bedtime  dextrose 5%. 1000milliLiter(s) IV Continuous <Continuous>  dextrose 50% Injectable 12.5Gram(s) IV Push once  dextrose 50% Injectable 25Gram(s) IV Push once  dextrose 50% Injectable 25Gram(s) IV Push once  aspirin  chewable 81milliGRAM(s) Oral daily  insulin lispro (HumaLOG) corrective regimen sliding scale  SubCutaneous Before meals and at bedtime  metoprolol 25milliGRAM(s) Oral two times a day  amLODIPine   Tablet 10milliGRAM(s) Oral daily  heparin  Injectable 5000Unit(s) SubCutaneous every 12 hours  metroNIDAZOLE    Tablet 500milliGRAM(s) Oral every 8 hours    MEDICATIONS  (PRN):  dextrose Gel 1Dose(s) Oral once PRN Blood Glucose LESS THAN 70 milliGRAM(s)/deciliter  acetaminophen   Tablet 650milliGRAM(s) Oral every 6 hours PRN For Temp greater than 38 C (100.4 F)      FAMILY HISTORY:  No pertinent family history in first degree relatives      Allergies    No Known Allergies    Intolerances        PMH/PSH:  LBBB (left bundle branch block)  Seizure  H/O: Hypothyroidism  Hypertension  Acute Renal Insufficiency  CHF (Congestive Heart Failure)  Diabetes Mellitus  Dyslipidemia  S/P cardiac cath  Diabetes  FH: HTN (hypertension)  Acute CHF  No Past Surgical History        REVIEW OF SYSTEMS:  CONSTITUTIONAL: No fever, weight loss, or fatigue  EYES: No eye pain, visual disturbances, or discharge  ENMT:  No difficulty hearing, tinnitus, vertigo; No sinus or throat pain  NECK: No pain or stiffness  BREASTS: No pain, masses, or nipple discharge  RESPIRATORY: No cough, wheezing, chills or hemoptysis; No shortness of breath  CARDIOVASCULAR: No chest pain, palpitations, dizziness, or leg swelling  GASTROINTESTINAL: See above  GENITOURINARY: No dysuria, frequency, hematuria, or incontinence  NEUROLOGICAL: No headaches, memory loss, loss of strength, numbness, or tremors  SKIN: No itching, burning, rashes, or lesions   LYMPH NODES: No enlarged glands  ENDOCRINE: No heat or cold intolerance; No hair loss  MUSCULOSKELETAL: No joint pain or swelling; No muscle, back, or extremity pain  PSYCHIATRIC: No depression, anxiety, mood swings, or difficulty sleeping  HEME/LYMPH: No easy bruising, or bleeding gums  ALLERGY AND IMMUNOLOGIC: No hives or eczema    Vital Signs Last 24 Hrs  T(C): 36.8, Max: 37.1 (04-08 @ 05:02)  T(F): 98.2, Max: 98.8 (04-08 @ 05:02)  HR: 66 (61 - 74)  BP: 117/40 (117/34 - 171/58)  BP(mean): --  RR: 16 (16 - 18)  SpO2: 100% (97% - 100%)    PHYSICAL EXAM:  GENERAL: NAD, well-groomed, well-developed  HEAD:  Atraumatic, Normocephalic  EYES: EOMI, PERRLA, conjunctiva and sclera clear  NECK: Supple, No JVD, Normal thyroid  NERVOUS SYSTEM:  Alert & Oriented X3, Good concentration; Motor Strength 5/5 B/L upper and lower extremities; DTRs 2+ intact and symmetric  CHEST/LUNG: Clear to percussion bilaterally; No rales, rhonchi, wheezing, or rubs  HEART: Regular rate and rhythm; No murmurs, rubs, or gallops  ABDOMEN: Soft, Nontender, Nondistended; Bowel sounds present  EXTREMITIES:  2+ Peripheral Pulses, No clubbing, cyanosis, or edema  LYMPH: No lymphadenopathy noted  SKIN: No rashes or lesions    LAB    CBC  WBC Count: 8.5 K/uL (04-08 @ 07:23)  Hemoglobin: 9.6 g/dL (04-08 @ 07:23)  Hematocrit: 30.0 % (04-08 @ 07:23)  Platelet Count - Automated: 287 K/uL (04-08 @ 07:23)  WBC Count: 7.9 K/uL (04-07 @ 08:06)  Hemoglobin: 9.1 g/dL (04-07 @ 08:06)  Hematocrit: 30.0 % (04-07 @ 08:06)      08 Apr 2017 07:23    140    |  103    |  28     ----------------------------<  104    3.7     |  27     |  5.53   07 Apr 2017 08:06    143    |  104    |  18     ----------------------------<  106    3.8     |  29     |  4.22   06 Apr 2017 06:20    146    |  107    |  26     ----------------------------<  92     3.5     |  27     |  5.19   05 Apr 2017 03:44    145    |  105    |  15     ----------------------------<  122    3.3     |  29     |  3.29   04 Apr 2017 03:30    142    |  102    |  45     ----------------------------<  99     4.4     |  23     |  6.23   03 Apr 2017 08:24    139    |  102    |  38     ----------------------------<  134    4.9     |  26     |  5.22   02 Apr 2017 23:05    138    |  102    |  33     ----------------------------<  161    3.8     |  19     |  4.82     Ca    8.7        08 Apr 2017 07:23  Ca    8.3        07 Apr 2017 08:06  Ca    8.3        06 Apr 2017 06:20  Ca    8.0        05 Apr 2017 03:44  Ca    7.9        04 Apr 2017 03:30  Ca    8.9        03 Apr 2017 08:24  Ca    8.9        02 Apr 2017 23:05  Phos  3.4       06 Apr 2017 06:20  Phos  2.6       05 Apr 2017 03:44  Phos  4.9       04 Apr 2017 03:30  Phos  4.4       03 Apr 2017 03:54  Mg     2.5       06 Apr 2017 06:20  Mg     2.4       05 Apr 2017 03:44  Mg     2.7       04 Apr 2017 03:30  Mg     2.7       02 Apr 2017 23:05    TPro  5.9    /  Alb  2.1    /  TBili  0.3    /  DBili  x      /  AST  242    /  ALT  117    /  AlkPhos  62     04 Apr 2017 03:30  TPro  7.1    /  Alb  2.6    /  TBili  0.5    /  DBili  x      /  AST  30     /  ALT  11     /  AlkPhos  70     02 Apr 2017 23:05  TPro  6.5    /  Alb  2.6    /  TBili  0.4    /  DBili  x      /  AST  15     /  ALT  19     /  AlkPhos  86     02 Apr 2017 00:29        CAPILLARY BLOOD GLUCOSE  254 (08 Apr 2017 16:17)  94 (08 Apr 2017 13:33)  101 (08 Apr 2017 07:38)  147 (07 Apr 2017 21:46)        RADIOLOGY & ADDITIONAL TESTS:    Imaging Personally Reviewed:  [ ] YES  [ ] NO    Consultant(s) Notes Reviewed:  [ ] YES  [ ] NO    Care Discussed with Consultants/Other Providers [ ] YES  [ ] NO mupirocin calcium (BACTROBAN) 2 % topical cream Apply  to affected area two (2) times a day. 30 g 2    lamoTRIgine (LAMICTAL) 100 mg tablet TAKE 1 TABLET BY MOUTH EVERY DAY  3    triamcinolone acetonide (KENALOG) 0.1 % ointment Apply 0.1 g to affected area two (2) times a day. 1    colestipol (COLESTID) 1 gram tablet Take 1 g by mouth daily. 0    dicyclomine (BENTYL) 10 mg capsule Take 10 mg by mouth daily. 5    Magnesium Oxide 500 mg cap Take 500 mg by mouth daily. Allergies   Allergen Reactions    Latex Itching    Latex Rash and Itching    Morphine Other (comments)     abd pain, nausea     Past Medical History:   Diagnosis Date    Anemia NEC     not during pregnancy    Eczema 3/24/2017    Gastrointestinal disorder     abdominal pain    Heart abnormalities     svt    HX OTHER MEDICAL     Left shoulder abscess 11/8/12    HX OTHER MEDICAL     tachycardia SVT    HX OTHER MEDICAL     MRSA Nov 12, left shoulder.     HX OTHER MEDICAL     LGA    Migraine     Other ill-defined conditions     rapid heart rate     SVT (supraventricular tachycardia) ([facility] [address] 9/16/2014    Thyroid disease     mild hypothyroidism     Past Surgical History:   Procedure Laterality Date    COLONOSCOPY N/A 5/26/2016    COLONOSCOPY / BIOPSY  performed by [facility] at [facility]  5/26/2016         HX CHOLECYSTECTOMY      HX OTHER SURGICAL      D&C    HX OTHER SURGICAL      lap arsen March 10    UPPER GI ENDOSCOPY,BIOPSY  11/24/2015          Family History   Problem Relation Age of Onset    Asthma Mother     SLE Mother    [facility] Migraines Father     Asthma Brother     Other Brother      autism    No Known Problems Brother     No Known Problems Daughter     No Known Problems Son     Cancer Paternal Aunt     Stroke Maternal Grandmother     Diabetes Paternal Grandfather     Heart Disease Paternal Grandfather     Heart Disease Maternal Grandfather     Cancer Maternal Grandfather lung cancer     Social History   Substance Use Topics    Smoking status: Never Smoker    Smokeless tobacco: Never Used    Alcohol use 0.0 oz/week     0 Standard drinks or equivalent per week      Comment: social        Review of Systems  Pertinent items are noted in HPI. Objective:     Visit Vitals    /79 (BP 1 Location: Left arm, BP Patient Position: Sitting)    Pulse 93    Temp 98.7 °F (37.1 °C) (Oral)    Resp 16    Ht 5' 5\" (1.651 m)    Wt 144 lb (65.3 kg)    LMP 07/11/2017    SpO2 98%    Breastfeeding No    BMI 23.96 kg/m2     General:  alert, cooperative, no distress   Eyes: negative   Ears: normal TM's and external ear canals AU   Sinuses: tenderness over bilateral maxillary   Mouth:  Lips, mucosa, and tongue normal. Teeth and gums normal and abnormal findings: marked oropharyngeal erythema   Neck: supple, symmetrical, trachea midline and mild anterior cervical adenopathy. Heart: S1 and S2 normal, no murmurs noted. Lungs: clear to auscultation bilaterally   Abdomen: soft, non-tender. Bowel sounds normal. No masses,  no organomegaly        Assessment/Plan:     Eczema dermatitis bilateral palms  Cellulitis bilateral hands due to severe flare up of eczema  Sinusitis    Discussed the dx and tx of sinusitis. Suggested symptomatic OTC remedies. Antibiotics per orders. RTC prn. Reviewed various OTC treatments of eczema, she is currently wearing gloves at night with thick salve or cream under them, possibly the infection has worsened from this routine. Encouraged her to wash the gloves in hot soapy water with bleach, and do not use them until the infection has healed. She manages her eczema very well, and despite working in , she generally is able to keep hands free from infections. I have suggested spray bandage, or liquid bandage to use over any acutely flared up and open areas on hands or arms, especially when going into work. ICD-10-CM ICD-9-CM    1.  Cellulitis of upper extremity, unspecified laterality L03.119 682.3 doxycycline (MONODOX) 100 mg capsule      mupirocin calcium (BACTROBAN) 2 % topical cream   2. Intrinsic eczema L20.84 691.8 mupirocin calcium (BACTROBAN) 2 % topical cream   3. Acute maxillary sinusitis, recurrence not specified J01.00 461.0 doxycycline (MONODOX) 100 mg capsule   .

## 2017-07-14 NOTE — PROGRESS NOTES
Chief Complaint   Patient presents with    Ear Drainage     c/o R sided ear drainage and some blood in ear last night    Rash     c/o that ezcema is flaring up on her hands and legs x 1 month, states she has been using neosporin to help     This note will not be viewable in MyChart.

## 2017-07-14 NOTE — PATIENT INSTRUCTIONS

## 2018-02-14 ENCOUNTER — OFFICE VISIT (OUTPATIENT)
Dept: FAMILY MEDICINE CLINIC | Age: 33
End: 2018-02-14

## 2018-02-14 VITALS
BODY MASS INDEX: 24.32 KG/M2 | HEART RATE: 92 BPM | OXYGEN SATURATION: 98 % | WEIGHT: 146 LBS | RESPIRATION RATE: 14 BRPM | TEMPERATURE: 98.2 F | SYSTOLIC BLOOD PRESSURE: 117 MMHG | HEIGHT: 65 IN | DIASTOLIC BLOOD PRESSURE: 82 MMHG

## 2018-02-14 DIAGNOSIS — M79.7 FIBROMYALGIA: Primary | ICD-10-CM

## 2018-02-14 DIAGNOSIS — R10.9 CHRONIC ABDOMINAL PAIN: ICD-10-CM

## 2018-02-14 DIAGNOSIS — K59.9 FUNCTIONAL BOWEL DISORDER: ICD-10-CM

## 2018-02-14 DIAGNOSIS — G89.29 CHRONIC ABDOMINAL PAIN: ICD-10-CM

## 2018-02-14 DIAGNOSIS — G43.909 MIGRAINE SYNDROME: ICD-10-CM

## 2018-02-14 RX ORDER — DULOXETIN HYDROCHLORIDE 30 MG/1
CAPSULE, DELAYED RELEASE ORAL
Refills: 5 | COMMUNITY
Start: 2018-01-26 | End: 2018-02-14 | Stop reason: DRUGHIGH

## 2018-02-14 RX ORDER — BUTALBITAL, ACETAMINOPHEN AND CAFFEINE 50; 325; 40 MG/1; MG/1; MG/1
TABLET ORAL
Refills: 0 | COMMUNITY
Start: 2018-02-05

## 2018-02-14 RX ORDER — DULOXETIN HYDROCHLORIDE 30 MG/1
30 CAPSULE, DELAYED RELEASE ORAL DAILY
Qty: 60 CAP | Refills: 1 | Status: SHIPPED | OUTPATIENT
Start: 2018-02-14 | End: 2018-02-14 | Stop reason: SDUPTHER

## 2018-02-14 RX ORDER — DULOXETIN HYDROCHLORIDE 30 MG/1
60 CAPSULE, DELAYED RELEASE ORAL DAILY
Qty: 60 CAP | Refills: 1 | Status: SHIPPED | OUTPATIENT
Start: 2018-02-14 | End: 2018-03-27 | Stop reason: SDUPTHER

## 2018-02-14 NOTE — PROGRESS NOTES
ALDO Humphrey is a 29 y.o. female who presents to [facility]. Has had an uneventful couple of years seen numerous specialists. She has been seeing Dr. Kevin Sanchez for irritable bowel  symptoms, has had numerous tests and patient reports that he has started calling it \"functional bowel\" problem. The problem that she has is a series of \"attacks\" which she describes as a clenching sensation in her epigastrium that then moves lower and can last for a couple hours. During this time she \"feels poison\" in her muscles hurt in her body aches. These been going on for years. Happened a few times per month. She takes Colestid and finds it without this medication she will have continuously loose stool. More recently she has started taking Bentyl daily and thinks that she finds this useful. She has been seeing a neurologist for migraines. Dr. Destinee Houston. The treatment that they arrived at is Lamictal and her as needed medication is Fioricet and Phenergan. Only having about 12 headache days per month now that she is on Lamictal.  At one point they stopped Lamictal see how she was doing and she had greater than 15 headache days that month. She has been seeing a rheumatologist.  Not entirely sure why she started seeing a rheumatologist but was diagnosed with fibromyalgia and started on Cymbalta in November. Initially on 20 mg was increased to 30 mg about 2 months ago and has noticed a significant improvement in her anxiety although has not seen much improvement in her fibromyalgia due to the medication. However she has started doing yoga and meditation and has found that this has improved her fibromyalgia substantially in about the pattern you would expect with fibromyalgia. She finds that if she does not do her yoga for 1 day she will regret it for several days.   She finds when she has a emotionally trying situation it does make her fibromyalgia worse    PMHx:  Past Medical History:   Diagnosis Date    Anemia NEC     not during pregnancy    Eczema 3/24/2017    Gastrointestinal disorder     abdominal pain    Heart abnormalities     svt    HX OTHER MEDICAL     Left shoulder abscess 11/8/12    HX OTHER MEDICAL     tachycardia SVT    HX OTHER MEDICAL     MRSA Nov 12, left shoulder.  HX OTHER MEDICAL     LGA    Migraine     Other ill-defined conditions(799.89)     rapid heart rate     SVT (supraventricular tachycardia) ([facility] [address] 9/16/2014    Thyroid disease     mild hypothyroidism       Meds:   Current Outpatient Prescriptions   Medication Sig Dispense Refill    butalbital-acetaminophen-caffeine (FIORICET, ESGIC) -40 mg per tablet TAKE 1 TABLET BY MOUTH EVERY 6 HOURS FOR 30 DAYS  0    DULoxetine (CYMBALTA) 30 mg capsule Take 1 Cap by mouth daily. 60 Cap 1    mupirocin calcium (BACTROBAN) 2 % topical cream Apply  to affected area two (2) times a day. 30 g 2    lamoTRIgine (LAMICTAL) 100 mg tablet TAKE 1 TABLET BY MOUTH EVERY DAY  3    colestipol (COLESTID) 1 gram tablet Take 1 g by mouth daily. 0    dicyclomine (BENTYL) 10 mg capsule Take 10 mg by mouth daily. 5    Magnesium Oxide 500 mg cap Take 500 mg by mouth daily.  triamcinolone acetonide (KENALOG) 0.1 % ointment Apply 0.1 g to affected area two (2) times a day. 1       Allergies:    Allergies   Allergen Reactions    Latex Itching    Latex Rash and Itching    Morphine Other (comments)     abd pain, nausea       Smoker:  History   Smoking Status    Never Smoker   Smokeless Tobacco    Never Used       ETOH:   History   Alcohol Use    0.0 oz/week    0 Standard drinks or equivalent per week     Comment: social       FH:   Family History   Problem Relation Age of Onset    Asthma Mother     SLE Mother    Le[facility] Rad[facility] Migraines Father     Asthma Brother     Other Brother      autism    No Known Problems Brother     No Known Problems Daughter     No Known Problems Son     Cancer Paternal Aunt     Stroke Maternal Grandmother     Diabetes Paternal Grandfather     Heart Disease Paternal Grandfather     Heart Disease Maternal Grandfather     Cancer Maternal Grandfather      lung cancer       ROS:   A comprehensive review of systems is negative except as listed in HPI. In addition:  Constitutional:   No headache, fever, fatigue, weight loss or weight gain      Cardiac:    No chest pain      Resp:   No cough or shortness of breath      Neuro   No loss of consciousness, dizziness, seizures      Physical Exam:  Blood pressure 117/82, pulse 92, temperature 98.2 °F (36.8 °C), resp. rate 14, height 5' 5\" (1.651 m), weight 146 lb (66.2 kg), SpO2 98 %, not currently breastfeeding. GEN: No apparent distress. Alert and oriented and responds to all questions appropriately. NEUROLOGIC:  No focal neurologic deficits. Strength and sensation grossly intact. Coordination and gait grossly intact. EXT: Well perfused. No edema. SKIN: No obvious rashes. Lungs clear to auscultation bilaterally  CV regular rate and rhythm no murmur  HEENT unremarkable  Very dry palms and knuckles. Presumably due to frequent handwashing work on        Assessment and Plan     establishing care, wants to know if I can manage some of her medications. Short answer is I could probably  help her manage her fibromyalgia and functional bowel problem since I believe these are probably related. Should remain involved with her neurologist for migraines though. Anxiety  Only discussed peripherally  Did not inquire whether she has a counselor    Fibromyalgia  Increase Cymbalta to 60 mg and try this for a few weeks. Would expect to see some improvement in her anxiety and possible improvement in her fibromyalgia. If you do not notice any improvement go back to 30 mg. Continue yoga,meditation. She finds this very helpful  We did not spend a lot of time talking about what else we need to do for her fibromyalgia and I did not examine her specifically for fibromyalgia.   We will see how she feels in 1 month deciding next steps    Functional bowel  Had her gallbladder removed, currently taking Colestid. Currently taking Benty daily and takes an extra one when she has a \"attack\". Has not paid attention to whether or not the Cymbalta is helping her stomach but will for the next month    Migraine  Managed by neurology    History of SVT, has not an episode of 2 years. Was followed by cardiology and prescribed propranolol at one point. I do not know that the underlying etiology was ever decided on. She insists these were not panic attacks    Works at [employer]  2 kids, boy and girl. Most recent one is 3years old obtain records from former PCP in [address] 1 month for medication management            ICD-10-CM ICD-9-CM    1. Fibromyalgia M79.7 729.1 DULoxetine (CYMBALTA) 30 mg capsule      DISCONTINUED: DULoxetine (CYMBALTA) 30 mg capsule   2. Migraine syndrome G43.909 346.00    3. Chronic abdominal pain R10.9 789.00     G89.29 338.29    4. Functional bowel disorder K59.9 564.9        AVS given.  Pt expressed understanding of instructions

## 2018-02-14 NOTE — MR AVS SNAPSHOT
Lety Elsasue 
 
 
 [address] [place] [address] Leeanna Jenkins [address] [place] [address] 
[phone] Patient: Cricket Bell MRN: [id number] [facility]:0/81/3048 Visit Information Date & Time Provider Department Dept. Phone Encounter #  
 2/14/2018  3:00 PM Efren Gardner MD [address] [facility] [phone] [id number] Upcoming Health Maintenance Date Due DTaP/Tdap/Td series (1 - Tdap) 9/12/2008 PAP AKA CERVICAL CYTOLOGY 11/11/2014 Allergies as of 2/14/2018  Review Complete On: 2/14/2018 By: Marilyn Doss Severity Noted Reaction Type Reactions Latex  12/29/2010    Itching Latex  10/14/2011   Intolerance Rash, Itching Morphine  12/29/2010    Other (comments)  
 abd pain, nausea Current Immunizations  Reviewed on 3/4/2016 Name Date Influenza Vaccine 10/1/2014 Influenza Vaccine Split 10/1/2012 Not reviewed this visit You Were Diagnosed With   
  
 Codes Comments Fibromyalgia    -  Primary ICD-10-CM: M79.7 ICD-9-CM: 729.1 Vitals BP Pulse Temp Resp Height(growth percentile) Weight(growth percentile) 117/82 (BP 1 Location: Right arm, BP Patient Position: Sitting) 92 98.2 °F (36.8 °C) 14 5' 5\" (1.651 m) 146 lb (66.2 kg) SpO2 BMI OB Status Smoking Status 98% 24.3 kg/m2 Having regular periods Never Smoker BMI and BSA Data Body Mass Index Body Surface Area  
 24.3 kg/m 2 1.74 m 2 Preferred Pharmacy Pharmacy Name Phone [id number] [phone] Your Updated Medication List  
  
   
This list is accurate as of: 2/14/18  4:12 PM.  Always use your most recent med list.  
  
  
  
  
 butalbital-acetaminophen-caffeine -40 mg per tablet Commonly known as:  FIORICET, ESGIC  
TAKE 1 TABLET BY MOUTH EVERY 6 HOURS FOR 30 DAYS  
  
 colestipol 1 gram tablet Commonly known as:  COLESTID  
 Take 1 g by mouth daily. dicyclomine 10 mg capsule Commonly known as:  BENTYL Take 10 mg by mouth daily. DULoxetine 30 mg capsule Commonly known as:  CYMBALTA Take 1 Cap by mouth daily. lamoTRIgine 100 mg tablet Commonly known as: LaMICtal  
TAKE 1 TABLET BY MOUTH EVERY DAY Magnesium Oxide 500 mg Cap Take 500 mg by mouth daily. mupirocin calcium 2 % topical cream  
Commonly known as:  [facility] [facility] Apply  to affected area two (2) times a day. triamcinolone acetonide 0.1 % ointment Commonly known as:  KENALOG Apply 0.1 g to affected area two (2) times a day. Prescriptions Sent to Pharmacy Refills DULoxetine (CYMBALTA) 30 mg capsule 1 Sig: Take 1 Cap by mouth daily. Class: Normal  
 Pharmacy: [facility] #[id number]- [address]  #: [phone] Route: Oral  
  
Introducing [facility]! Dear Lambert Dietz: 
Thank you for requesting a [employer] account. Our records indicate that you already have an active [employer] account. You can access your account anytime at [email or web address] Did you know that you can access your hospital and ER discharge instructions at any time in [employer]? You can also review all of your test results from your hospital stay or ER visit. Additional Information If you have questions, please visit the Frequently Asked Questions section of the [employer] website at [email or web address] Remember, [employer] is NOT to be used for urgent needs. For medical emergencies, dial 911. Now available from your iPhone and Android! Please provide this summary of care documentation to your next provider. Your primary care clinician is listed as Cali Navarro. If you have any questions after today's visit, please call [phone].

## 2018-02-14 NOTE — PROGRESS NOTES
.  Chief Complaint   Patient presents with    Check Up     once one doctor to manage her meds     . Genevieve Copeland

## 2018-02-21 ENCOUNTER — OFFICE VISIT (OUTPATIENT)
Dept: FAMILY MEDICINE CLINIC | Age: 33
End: 2018-02-21

## 2018-02-21 VITALS
SYSTOLIC BLOOD PRESSURE: 134 MMHG | TEMPERATURE: 98.1 F | BODY MASS INDEX: 24.32 KG/M2 | OXYGEN SATURATION: 97 % | WEIGHT: 146 LBS | DIASTOLIC BLOOD PRESSURE: 84 MMHG | RESPIRATION RATE: 12 BRPM | HEART RATE: 122 BPM | HEIGHT: 65 IN

## 2018-02-21 DIAGNOSIS — M79.7 FIBROMYALGIA: ICD-10-CM

## 2018-02-21 DIAGNOSIS — S99.921A INJURY OF TOE ON RIGHT FOOT, INITIAL ENCOUNTER: Primary | ICD-10-CM

## 2018-02-21 DIAGNOSIS — K59.9 FUNCTIONAL BOWEL DISORDER: ICD-10-CM

## 2018-02-21 NOTE — PROGRESS NOTES
HPI  Jose Jin is a 29 y.o. female who presents with a toe injury. She stubbed her pinky toe on the right foot on the leg of a chair. It was a barefoot incident. The pinky toe was  by the leg of the chair and most of the discomfort she is feeling is actually between the fourth and fifth MTPs. There is a significant amount of bruising when this happened 4 days ago but it has subsided. PMHx:  Past Medical History:   Diagnosis Date    Anemia NEC     not during pregnancy    Eczema 3/24/2017    Gastrointestinal disorder     abdominal pain    Heart abnormalities     svt    HX OTHER MEDICAL     Left shoulder abscess 11/8/12    HX OTHER MEDICAL     tachycardia SVT    HX OTHER MEDICAL     MRSA Nov 12, left shoulder.  HX OTHER MEDICAL     LGA    Migraine     Other ill-defined conditions(799.89)     rapid heart rate     SVT (supraventricular tachycardia) ([facility] [address] 9/16/2014    Thyroid disease     mild hypothyroidism       Meds:   Current Outpatient Prescriptions   Medication Sig Dispense Refill    butalbital-acetaminophen-caffeine (FIORICET, ESGIC) -40 mg per tablet TAKE 1 TABLET BY MOUTH EVERY 6 HOURS FOR 30 DAYS  0    DULoxetine (CYMBALTA) 30 mg capsule Take 2 Caps by mouth daily. 60 Cap 1    mupirocin calcium (BACTROBAN) 2 % topical cream Apply  to affected area two (2) times a day. 30 g 2    lamoTRIgine (LAMICTAL) 100 mg tablet TAKE 1 TABLET BY MOUTH EVERY DAY  3    triamcinolone acetonide (KENALOG) 0.1 % ointment Apply 0.1 g to affected area two (2) times a day. 1    colestipol (COLESTID) 1 gram tablet Take 1 g by mouth daily. 0    dicyclomine (BENTYL) 10 mg capsule Take 10 mg by mouth daily. 5    Magnesium Oxide 500 mg cap Take 500 mg by mouth daily. Allergies:    Allergies   Allergen Reactions    Latex Itching    Latex Rash and Itching    Morphine Other (comments)     abd pain, nausea       Smoker:  History   Smoking Status    Never Smoker   Smokeless Tobacco    Never Used       ETOH:   History   Alcohol Use    0.0 oz/week    0 Standard drinks or equivalent per week     Comment: social       FH:   Family History   Problem Relation Age of Onset    Asthma Mother     SLE Mother     Migraines Father     Asthma Brother     Other Brother      autism    No Known Problems Brother     No Known Problems Daughter     No Known Problems Son     Cancer Paternal Aunt     Stroke Maternal Grandmother     Diabetes Paternal Grandfather     Heart Disease Paternal Grandfather     Heart Disease Maternal Grandfather     Cancer Maternal Grandfather      lung cancer       ROS:   As listed in HPI. In addition:  Constitutional:   No headache, fever, fatigue, weight loss or weight gain      Cardiac:    No chest pain      Resp:   No cough or shortness of breath      Neuro   No loss of consciousness, dizziness, seizures      Physical Exam:  Blood pressure 134/84, pulse (!) 122, temperature 98.1 °F (36.7 °C), resp. rate 12, height 5' 5\" (1.651 m), weight 146 lb (66.2 kg), SpO2 97 %, not currently breastfeeding. GEN: No apparent distress. Alert and oriented and responds to all questions appropriately. NEUROLOGIC:  No focal neurologic deficits. Strength and sensation grossly intact. Coordination and gait grossly intact. EXT: Well perfused. No edema. SKIN: No obvious rashes. Right foot examined, there is a little bruising between the fourth and fifth MTPs. There is pain to palpation of the fifth MTP. Is a full range of motion that is nontender both active and passive. No crepitus       Assessment and Plan     She has been worried about doing further damage to the toe and therefore has been avoiding yoga. This is unfortunate because this has been the most effective therapy for her fibromyalgia. Looks fine, might be sprain. No apparent fracture. Would not really matter if there was in the fifth toe without other obvious deformity. Return to activity as tolerated. Strongly encourage yoga    As to her fibromyalgia she is tolerating the increased dose of Cymbalta well. She is taking 30 mg in the morning and the evening. Evidently 60 mg at night kept her up and she has not tried 60 mg in the morning. Encourage her to give this a try at some point in case we want to go with a 60 mg capsule      ICD-10-CM ICD-9-CM    1. Injury of toe on right foot, initial encounter S99.921A 959.7    2. Fibromyalgia M79.7 729.1    3. Functional bowel disorder K59.9 564.9        AVS given.  Pt expressed understanding of instructions

## 2018-03-27 DIAGNOSIS — M79.7 FIBROMYALGIA: ICD-10-CM

## 2018-03-27 RX ORDER — DULOXETIN HYDROCHLORIDE 30 MG/1
60 CAPSULE, DELAYED RELEASE ORAL DAILY
Qty: 60 CAP | Refills: 1 | Status: SHIPPED | OUTPATIENT
Start: 2018-03-27 | End: 2018-05-10 | Stop reason: SDUPTHER

## 2018-05-10 DIAGNOSIS — M79.7 FIBROMYALGIA: ICD-10-CM

## 2018-05-11 RX ORDER — DULOXETIN HYDROCHLORIDE 30 MG/1
60 CAPSULE, DELAYED RELEASE ORAL DAILY
Qty: 180 CAP | Refills: 3 | Status: SHIPPED | OUTPATIENT
Start: 2018-05-11 | End: 2018-08-30 | Stop reason: SDUPTHER

## 2018-06-29 ENCOUNTER — APPOINTMENT (OUTPATIENT)
Dept: GENERAL RADIOLOGY | Age: 33
End: 2018-06-29
Attending: EMERGENCY MEDICINE
Payer: MEDICAID

## 2018-06-29 ENCOUNTER — HOSPITAL ENCOUNTER (EMERGENCY)
Age: 33
Discharge: HOME | End: 2018-06-29
Attending: EMERGENCY MEDICINE
Payer: MEDICAID

## 2018-06-29 VITALS
WEIGHT: 150.79 LBS | RESPIRATION RATE: 15 BRPM | TEMPERATURE: 97.4 F | SYSTOLIC BLOOD PRESSURE: 130 MMHG | DIASTOLIC BLOOD PRESSURE: 87 MMHG | HEIGHT: 64 IN | OXYGEN SATURATION: 99 % | BODY MASS INDEX: 25.74 KG/M2 | HEART RATE: 79 BPM

## 2018-06-29 DIAGNOSIS — R07.9 ACUTE CHEST PAIN: Primary | ICD-10-CM

## 2018-06-29 DIAGNOSIS — R07.89 ATYPICAL CHEST PAIN: ICD-10-CM

## 2018-06-29 DIAGNOSIS — M54.12 ACUTE CERVICAL RADICULOPATHY: ICD-10-CM

## 2018-06-29 DIAGNOSIS — S16.1XXA STRAIN OF NECK MUSCLE, INITIAL ENCOUNTER: ICD-10-CM

## 2018-06-29 LAB
ALBUMIN SERPL-MCNC: 4.2 G/DL (ref 3.5–5)
ALBUMIN/GLOB SERPL: 1.1 {RATIO} (ref 1.1–2.2)
ALP SERPL-CCNC: 76 U/L (ref 45–117)
ALT SERPL-CCNC: 23 U/L (ref 12–78)
ANION GAP SERPL CALC-SCNC: 7 MMOL/L (ref 5–15)
APPEARANCE UR: CLEAR
AST SERPL-CCNC: 13 U/L (ref 15–37)
ATRIAL RATE: 95 BPM
BACTERIA URNS QL MICRO: NEGATIVE /HPF
BASOPHILS # BLD: 0 K/UL (ref 0–0.1)
BASOPHILS NFR BLD: 0 % (ref 0–1)
BILIRUB SERPL-MCNC: 0.6 MG/DL (ref 0.2–1)
BILIRUB UR QL: NEGATIVE
BUN SERPL-MCNC: 9 MG/DL (ref 6–20)
BUN/CREAT SERPL: 10 (ref 12–20)
CALCIUM SERPL-MCNC: 9 MG/DL (ref 8.5–10.1)
CALCULATED P AXIS, ECG09: 76 DEGREES
CALCULATED R AXIS, ECG10: 43 DEGREES
CALCULATED T AXIS, ECG11: 56 DEGREES
CHLORIDE SERPL-SCNC: 102 MMOL/L (ref 97–108)
CK SERPL-CCNC: 95 U/L (ref 26–192)
CO2 SERPL-SCNC: 29 MMOL/L (ref 21–32)
COLOR UR: NORMAL
CREAT SERPL-MCNC: 0.87 MG/DL (ref 0.55–1.02)
DIAGNOSIS, 93000: NORMAL
DIFFERENTIAL METHOD BLD: NORMAL
EOSINOPHIL # BLD: 0.1 K/UL (ref 0–0.4)
EOSINOPHIL NFR BLD: 2 % (ref 0–7)
EPITH CASTS URNS QL MICRO: NORMAL /LPF
ERYTHROCYTE [DISTWIDTH] IN BLOOD BY AUTOMATED COUNT: 11.7 % (ref 11.5–14.5)
GLOBULIN SER CALC-MCNC: 3.8 G/DL (ref 2–4)
GLUCOSE SERPL-MCNC: 94 MG/DL (ref 65–100)
GLUCOSE UR STRIP.AUTO-MCNC: NEGATIVE MG/DL
HCG UR QL: NEGATIVE
HCT VFR BLD AUTO: 38.7 % (ref 35–47)
HGB BLD-MCNC: 13.3 G/DL (ref 11.5–16)
HGB UR QL STRIP: NEGATIVE
HYALINE CASTS URNS QL MICRO: NORMAL /LPF (ref 0–5)
IMM GRANULOCYTES # BLD: 0 K/UL (ref 0–0.04)
IMM GRANULOCYTES NFR BLD AUTO: 0 % (ref 0–0.5)
KETONES UR QL STRIP.AUTO: NEGATIVE MG/DL
LEUKOCYTE ESTERASE UR QL STRIP.AUTO: NEGATIVE
LIPASE SERPL-CCNC: 172 U/L (ref 73–393)
LYMPHOCYTES # BLD: 2.4 K/UL (ref 0.8–3.5)
LYMPHOCYTES NFR BLD: 33 % (ref 12–49)
MCH RBC QN AUTO: 30.3 PG (ref 26–34)
MCHC RBC AUTO-ENTMCNC: 34.4 G/DL (ref 30–36.5)
MCV RBC AUTO: 88.2 FL (ref 80–99)
MONOCYTES # BLD: 0.4 K/UL (ref 0–1)
MONOCYTES NFR BLD: 6 % (ref 5–13)
NEUTS SEG # BLD: 4.2 K/UL (ref 1.8–8)
NEUTS SEG NFR BLD: 59 % (ref 32–75)
NITRITE UR QL STRIP.AUTO: NEGATIVE
NRBC # BLD: 0 K/UL (ref 0–0.01)
NRBC BLD-RTO: 0 PER 100 WBC
P-R INTERVAL, ECG05: 152 MS
PH UR STRIP: 6.5 [PH] (ref 5–8)
PLATELET # BLD AUTO: 230 K/UL (ref 150–400)
PMV BLD AUTO: 9.9 FL (ref 8.9–12.9)
POTASSIUM SERPL-SCNC: 3.5 MMOL/L (ref 3.5–5.1)
PROT SERPL-MCNC: 8 G/DL (ref 6.4–8.2)
PROT UR STRIP-MCNC: NEGATIVE MG/DL
Q-T INTERVAL, ECG07: 368 MS
QRS DURATION, ECG06: 90 MS
QTC CALCULATION (BEZET), ECG08: 462 MS
RBC # BLD AUTO: 4.39 M/UL (ref 3.8–5.2)
RBC #/AREA URNS HPF: NORMAL /HPF (ref 0–5)
SODIUM SERPL-SCNC: 138 MMOL/L (ref 136–145)
SP GR UR REFRACTOMETRY: 1.01 (ref 1–1.03)
TROPONIN I SERPL-MCNC: <0.05 NG/ML
UA: UC IF INDICATED,UAUC: NORMAL
UROBILINOGEN UR QL STRIP.AUTO: 0.2 EU/DL (ref 0.2–1)
VENTRICULAR RATE, ECG03: 95 BPM
WBC # BLD AUTO: 7.2 K/UL (ref 3.6–11)
WBC URNS QL MICRO: NORMAL /HPF (ref 0–4)

## 2018-06-29 PROCEDURE — 71045 X-RAY EXAM CHEST 1 VIEW: CPT

## 2018-06-29 PROCEDURE — 82550 ASSAY OF CK (CPK): CPT | Performed by: EMERGENCY MEDICINE

## 2018-06-29 PROCEDURE — 96374 THER/PROPH/DIAG INJ IV PUSH: CPT

## 2018-06-29 PROCEDURE — 85025 COMPLETE CBC W/AUTO DIFF WBC: CPT | Performed by: EMERGENCY MEDICINE

## 2018-06-29 PROCEDURE — 96375 TX/PRO/DX INJ NEW DRUG ADDON: CPT

## 2018-06-29 PROCEDURE — 81001 URINALYSIS AUTO W/SCOPE: CPT | Performed by: EMERGENCY MEDICINE

## 2018-06-29 PROCEDURE — 74011250636 HC RX REV CODE- 250/636: Performed by: EMERGENCY MEDICINE

## 2018-06-29 PROCEDURE — 84484 ASSAY OF TROPONIN QUANT: CPT | Performed by: EMERGENCY MEDICINE

## 2018-06-29 PROCEDURE — 99284 EMERGENCY DEPT VISIT MOD MDM: CPT

## 2018-06-29 PROCEDURE — 81025 URINE PREGNANCY TEST: CPT | Performed by: EMERGENCY MEDICINE

## 2018-06-29 PROCEDURE — 93005 ELECTROCARDIOGRAM TRACING: CPT

## 2018-06-29 PROCEDURE — 36415 COLL VENOUS BLD VENIPUNCTURE: CPT | Performed by: EMERGENCY MEDICINE

## 2018-06-29 PROCEDURE — 80053 COMPREHEN METABOLIC PANEL: CPT | Performed by: EMERGENCY MEDICINE

## 2018-06-29 PROCEDURE — 83690 ASSAY OF LIPASE: CPT | Performed by: EMERGENCY MEDICINE

## 2018-06-29 PROCEDURE — 74011250637 HC RX REV CODE- 250/637: Performed by: EMERGENCY MEDICINE

## 2018-06-29 RX ORDER — LAMOTRIGINE 150 MG/1
150 TABLET ORAL DAILY
COMMUNITY

## 2018-06-29 RX ORDER — METHOCARBAMOL 750 MG/1
750 TABLET, FILM COATED ORAL 4 TIMES DAILY
Qty: 20 TAB | Refills: 0 | Status: SHIPPED | OUTPATIENT
Start: 2018-06-29

## 2018-06-29 RX ORDER — LIDOCAINE 50 MG/G
PATCH TOPICAL
Qty: 1 EACH | Refills: 0 | Status: SHIPPED | OUTPATIENT
Start: 2018-06-29

## 2018-06-29 RX ORDER — KETOROLAC TROMETHAMINE 30 MG/ML
15 INJECTION, SOLUTION INTRAMUSCULAR; INTRAVENOUS
Status: COMPLETED | OUTPATIENT
Start: 2018-06-29 | End: 2018-06-29

## 2018-06-29 RX ORDER — ONDANSETRON 2 MG/ML
4 INJECTION INTRAMUSCULAR; INTRAVENOUS
Status: COMPLETED | OUTPATIENT
Start: 2018-06-29 | End: 2018-06-29

## 2018-06-29 RX ORDER — KETOROLAC TROMETHAMINE 10 MG/1
10 TABLET, FILM COATED ORAL
Qty: 20 TAB | Refills: 0 | Status: SHIPPED | OUTPATIENT
Start: 2018-06-29

## 2018-06-29 RX ORDER — DIAZEPAM 5 MG/1
5 TABLET ORAL
Status: COMPLETED | OUTPATIENT
Start: 2018-06-29 | End: 2018-06-29

## 2018-06-29 RX ORDER — LIDOCAINE 50 MG/G
1 PATCH TOPICAL ONCE
Status: DISCONTINUED | OUTPATIENT
Start: 2018-06-29 | End: 2018-06-29 | Stop reason: HOSPADM

## 2018-06-29 RX ORDER — METHYLPREDNISOLONE 4 MG/1
TABLET ORAL
Qty: 1 DOSE PACK | Refills: 0 | Status: SHIPPED | OUTPATIENT
Start: 2018-06-29

## 2018-06-29 RX ADMIN — ONDANSETRON 4 MG: 2 INJECTION INTRAMUSCULAR; INTRAVENOUS at 05:18

## 2018-06-29 RX ADMIN — KETOROLAC TROMETHAMINE 15 MG: 30 INJECTION, SOLUTION INTRAMUSCULAR at 05:18

## 2018-06-29 RX ADMIN — DIAZEPAM 5 MG: 5 TABLET ORAL at 05:18

## 2018-06-29 NOTE — ED PROVIDER NOTES
EMERGENCY DEPARTMENT HISTORY AND PHYSICAL EXAM      Date: 6/29/2018  Patient Name: Shirin Serra    History of Presenting Illness     Chief Complaint   Patient presents with    Chest Pain     Ambulatory w/ c/o mid sternal CP started about 0230 and radiates to BL shoulders, history of SVT sees Serafin       History Provided By: Patient    HPI: Shirin Serra, 29 y.o. female with PMHx significant for anemia, SVT, hypothyroidism, presents ambulatory to the ED with cc of an acute onset of sharp, stabbing, sternal chest pain radiating to the B/L shoulders x0230 this morning. Pt reports associated neck pain as well. She expresses she had just laid down to go to sleep when she had a sudden onset of sharp, stabbing pain radiating to her shoulders. Pt discloses her chest pain has improved noting she is still having intermittent discomfort but her B/L shoulders and neck persist with pain leading her to the ED. She denies any exacerbating factors to her pain and denies any relieving factors. Pt notes taking Excedrin prior to onset of sx. Pt denies any h/o similar sx. She reports a h/o SVT noting her last cardiology follow up to be ~6 months ago and denies being on medications for sx. She denies any fevers, chills, diaphoresis, SOB, cough, abdominal pain, nausea, vomiting, or diarrhea. There are no other complaints, changes, or physical findings at this time. PCP: Ila Hanna MD   SHx: (-) Tobacco; (+) ETOH: social; (-) Illicit drug use    Current Outpatient Prescriptions   Medication Sig Dispense Refill    DULoxetine (CYMBALTA) 30 mg capsule Take 2 Caps by mouth daily. 180 Cap 3    butalbital-acetaminophen-caffeine (FIORICET, ESGIC) -40 mg per tablet TAKE 1 TABLET BY MOUTH EVERY 6 HOURS FOR 30 DAYS  0    mupirocin calcium (BACTROBAN) 2 % topical cream Apply  to affected area two (2) times a day.  30 g 2    lamoTRIgine (LAMICTAL) 100 mg tablet TAKE 1 TABLET BY MOUTH EVERY DAY  3    triamcinolone acetonide (KENALOG) 0.1 % ointment Apply 0.1 g to affected area two (2) times a day. 1    colestipol (COLESTID) 1 gram tablet Take 1 g by mouth daily. 0    dicyclomine (BENTYL) 10 mg capsule Take 10 mg by mouth daily. 5    Magnesium Oxide 500 mg cap Take 500 mg by mouth daily. 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     HX OTHER MEDICAL     LGA    Migraine     Other ill-defined conditions(799.89)     rapid heart rate     SVT [facility]supraventricular tachycardia) ([facility] [address] 9/16/2014    Thyroid disease     mild hypothyroidism       Past Surgical History:  Past Surgical History:   Procedure Laterality Date    COLONOSCOPY N/A 5/26/2016    COLONOSCOPY / BIOPSY  performed by Valerio Dancer[facility] at [facility]  5/26/2016         HX CHOLECYSTECTOMY      HX OTHER SURGICAL      D&C    HX OTHER SURGICAL      lap arsen March 10    UPPER GI ENDOSCOPY,BIOPSY  11/24/2015            Family History:  Family History   Problem Relation Age of Onset    Asthma Mother     SLE Mother    [facility] Migraines Father     Asthma Brother     Other Brother      autism    No Known Problems Brother     No Known Problems Daughter     No Known Problems Son     Cancer Paternal Aunt     Stroke Maternal Grandmother     Diabetes Paternal Grandfather     Heart Disease Paternal Grandfather     Heart Disease Maternal Grandfather     Cancer Maternal Grandfather      lung cancer       Social History:  Social History   Substance Use Topics    Smoking status: Never Smoker    Smokeless tobacco: Never Used    Alcohol use 0.0 oz/week     0 Standard drinks or equivalent per week      Comment: social       Allergies: Allergies   Allergen Reactions    Latex Itching    Latex Rash and Itching    Morphine Other (comments)     abd pain, nausea         Review of Systems   Review of Systems   Constitutional: Negative. Negative for chills and fever. HENT: Negative. Negative for congestion, facial swelling, rhinorrhea, sore throat, trouble swallowing and voice change. Eyes: Negative. Respiratory: Negative. Negative for apnea, cough, chest tightness, shortness of breath and wheezing. Cardiovascular: Negative. Negative for chest pain, palpitations and leg swelling. Gastrointestinal: Negative. Negative for abdominal distention, abdominal pain, blood in stool, constipation, diarrhea, nausea and vomiting. Endocrine: Negative. Negative for cold intolerance, heat intolerance and polyuria. Genitourinary: Negative. Negative for difficulty urinating, dysuria, flank pain, frequency, hematuria and urgency. Musculoskeletal: Negative. Negative for arthralgias, back pain, myalgias, neck pain and neck stiffness. Skin: Negative. Negative for color change and rash. Neurological: Negative. Negative for dizziness, syncope, facial asymmetry, speech difficulty, weakness, light-headedness, numbness and headaches. Hematological: Negative. Does not bruise/bleed easily. Psychiatric/Behavioral: Negative. Negative for confusion and self-injury. The patient is not nervous/anxious. Physical Exam   Physical Exam   Constitutional: She is oriented to person, place, and time. She appears well-developed and well-nourished. No distress. HENT:   Head: Normocephalic and atraumatic. Mouth/Throat: Oropharynx is clear and moist. No oropharyngeal exudate. Eyes: Conjunctivae and EOM are normal. Pupils are equal, round, and reactive to light. Neck: Normal range of motion. Tenderness right paracervical muscles and right trapezius   Cardiovascular: Normal rate, regular rhythm and normal heart sounds. Exam reveals no gallop and no friction rub. No murmur heard. Pulmonary/Chest: Effort normal and breath sounds normal. No respiratory distress. She has no wheezes. She has no rales. She exhibits no tenderness. Abdominal: Soft. Bowel sounds are normal. She exhibits no distension and no mass. There is no tenderness. There is no rebound and no guarding. Musculoskeletal: Normal range of motion. She exhibits no edema or deformity. Neurological: She is alert and oriented to person, place, and time. She displays normal reflexes. No cranial nerve deficit. She exhibits normal muscle tone. Coordination normal.   Skin: Skin is warm. No rash noted. She is not diaphoretic. Psychiatric: She has a normal mood and affect. Nursing note and vitals reviewed.       Diagnostic Study Results     Labs -     Recent Results (from the past 12 hour(s))   EKG, 12 LEAD, INITIAL    Collection Time: 06/29/18  3:41 AM   Result Value Ref Range    Ventricular Rate 95 BPM    Atrial Rate 95 BPM    P-R Interval 152 ms    QRS Duration 90 ms    Q-T Interval 368 ms    QTC Calculation (Bezet) 462 ms    Calculated P Axis 76 degrees    Calculated R Axis 43 degrees    Calculated T Axis 56 degrees    Diagnosis       Normal sinus rhythm  Possible Left atrial enlargement  When compared with ECG of 26-JAN-2017 10:54,  No significant change was found     URINALYSIS W/ REFLEX CULTURE    Collection Time: 06/29/18  3:52 AM   Result Value Ref Range    Color YELLOW/STRAW      Appearance CLEAR CLEAR      Specific gravity 1.009 1.003 - 1.030      pH (UA) 6.5 5.0 - 8.0      Protein NEGATIVE  NEG mg/dL    Glucose NEGATIVE  NEG mg/dL    Ketone NEGATIVE  NEG mg/dL    Bilirubin NEGATIVE  NEG      Blood NEGATIVE  NEG      Urobilinogen 0.2 0.2 - 1.0 EU/dL    Nitrites NEGATIVE  NEG      Leukocyte Esterase NEGATIVE  NEG      WBC 0-4 0 - 4 /hpf    RBC 0-5 0 - 5 /hpf    Epithelial cells FEW FEW /lpf    Bacteria NEGATIVE  NEG /hpf    UA:UC IF INDICATED CULTURE NOT INDICATED BY UA RESULT CNI      Hyaline cast 0-2 0 - 5 /lpf   HCG URINE, QL    Collection Time: 06/29/18  3:52 AM   Result Value Ref Range    HCG urine, QL NEGATIVE  NEG     CBC WITH AUTOMATED DIFF    Collection Time: 06/29/18  4:00 AM   Result Value Ref Range    WBC 7.2 3.6 - 11.0 K/uL    RBC 4.39 3.80 - 5.20 M/uL    HGB 13.3 11.5 - 16.0 g/dL    HCT 38.7 35.0 - 47.0 %    MCV 88.2 80.0 - 99.0 FL    MCH 30.3 26.0 - 34.0 PG    MCHC 34.4 30.0 - 36.5 g/dL    RDW 11.7 11.5 - 14.5 %    PLATELET 402 184 - 883 K/uL    MPV 9.9 8.9 - 12.9 FL    NRBC 0.0 0  WBC    ABSOLUTE NRBC 0.00 0.00 - 0.01 K/uL    NEUTROPHILS 59 32 - 75 %    LYMPHOCYTES 33 12 - 49 %    MONOCYTES 6 5 - 13 %    EOSINOPHILS 2 0 - 7 %    BASOPHILS 0 0 - 1 %    IMMATURE GRANULOCYTES 0 0.0 - 0.5 %    ABS. NEUTROPHILS 4.2 1.8 - 8.0 K/UL    ABS. LYMPHOCYTES 2.4 0.8 - 3.5 K/UL    ABS. MONOCYTES 0.4 0.0 - 1.0 K/UL    ABS. EOSINOPHILS 0.1 0.0 - 0.4 K/UL    ABS. BASOPHILS 0.0 0.0 - 0.1 K/UL    ABS. IMM. GRANS. 0.0 0.00 - 0.04 K/UL    DF AUTOMATED     METABOLIC PANEL, COMPREHENSIVE    Collection Time: 06/29/18  4:00 AM   Result Value Ref Range    Sodium 138 136 - 145 mmol/L    Potassium 3.5 3.5 - 5.1 mmol/L    Chloride 102 97 - 108 mmol/L    CO2 29 21 - 32 mmol/L    Anion gap 7 5 - 15 mmol/L    Glucose 94 65 - 100 mg/dL    BUN 9 6 - 20 MG/DL    Creatinine 0.87 0.55 - 1.02 MG/DL    BUN/Creatinine ratio 10 (L) 12 - 20      GFR est AA >60 >60 ml/min/1.73m2    GFR est non-AA >60 >60 ml/min/1.73m2    Calcium 9.0 8.5 - 10.1 MG/DL    Bilirubin, total 0.6 0.2 - 1.0 MG/DL    ALT (SGPT) 23 12 - 78 U/L    AST (SGOT) 13 (L) 15 - 37 U/L    Alk.  phosphatase 76 45 - 117 U/L    Protein, total 8.0 6.4 - 8.2 g/dL    Albumin 4.2 3.5 - 5.0 g/dL    Globulin 3.8 2.0 - 4.0 g/dL    A-G Ratio 1.1 1.1 - 2.2     CK W/ REFLX CKMB    Collection Time: 06/29/18  4:00 AM   Result Value Ref Range    CK 95 26 - 192 U/L   TROPONIN I    Collection Time: 06/29/18  4:00 AM   Result Value Ref Range Troponin-I, Qt. <0.05 <0.05 ng/mL   LIPASE    Collection Time: 06/29/18  4:00 AM   Result Value Ref Range    Lipase 172 73 - 393 U/L       Radiologic Studies -   CXR Results  (Last 48 hours)               06/29/18 0504  XR CHEST PORT Final result    Impression:  IMPRESSION:   No acute process. Narrative:  INDICATION:   acute chest pain       EXAM:  AP CHEST RADIOGRAPH       COMPARISON: June 16, 2017       FINDINGS:       AP portable view of the chest demonstrates a normal cardiomediastinal   silhouette. The lungs are adequately expanded. There is no edema, effusion,   consolidation, or pneumothorax. The osseous structures are unremarkable. Medical Decision Making   I am the first provider for this patient. I reviewed the vital signs, available nursing notes, past medical history, past surgical history, family history and social history. Vital Signs-Reviewed the patient's vital signs. Patient Vitals for the past 12 hrs:   Temp Pulse Resp BP SpO2   06/29/18 0443 - - - - 99 %   06/29/18 0348 97.4 °F (36.3 °C) 95 19 145/88 100 %       Pulse Oximetry Analysis - 100% on room air    Cardiac Monitor:   Rate: 95 bpm  Rhythm: Normal Sinus Rhythm      EKG interpretation: (Preliminary) 0341  Rhythm: normal sinus rhythm; and regular . Rate (approx.): 95; Axis: normal; DC interval: normal; QRS interval: normal ; ST/T wave: normal; Other findings: non-ischemic. Records Reviewed: Nursing Notes, Old Medical Records, Previous electrocardiograms, Previous Radiology Studies and Previous Laboratory Studies    Provider Notes (Medical Decision Making):     DDx: ACS, Cervical radiculopathy, Costochondritis. The pt is unlikely to have PE. There is no pleuritic chest pain, no tachycardia, no hypoxia, pt non-smoker, no unilateral leg swelling, no hormone use, no recent travel/immobilization, no recent surgery. Therefore no d-dimer or PE    The pt is unlikely to have aortic dissection.  The pulses are equal, there is no sharp tearing chest pain radiating to back, the patient is not extremily hypertensive. Therefore no d-dimer or CTA chest for dissection    Patient not extremely hypertensive, unlikely to be hypertensive emergency. No history of valve abnormality and no harsh murmur, no signs of flash pulmonary edema, therefore less likely ruptured valve. Endocarditis unlikely -- no IV drug abuse, native valve, no fevers, patient well appearing, no murmurs/splinter hemorrhages/janeway lesions or oslar nodes    The pt is unlikley to have esophageal rupture. There is no history of forceful vomiting, patient well appearing, no difficulty swallowing    Will pursue cardiac work-up with EKG, troponin, ckmb, CXR. While the pt is less likely to have pneumonia as there is no cough and no fever, and less likely to have ptx, will order CXR to assess lung fields     ED Course:   Initial assessment performed. The patients presenting problems have been discussed, and they are in agreement with the care plan formulated and outlined with them. I have encouraged them to ask questions as they arise throughout their visit. Progress Notes:    Medications   ketorolac (TORADOL) injection 15 mg (15 mg IntraVENous Given 6/29/18 0518)   diazePAM (VALIUM) tablet 5 mg (5 mg Oral Given 6/29/18 0518)   ondansetron (ZOFRAN) injection 4 mg (4 mg IntraVENous Given 6/29/18 0518)       4:35 AM   The pt has been re-evaluated. She was updated on reassuring lab findings. Awaiting CXR for final disposition. 5:23 AM   The pt has been re-evaluated. She was updated on reassuring CXR findings and informed of the plan for discharge with symptomatic management and follow up as needed. Critical Care Time: 0 minutes    Disposition:  Discharge Note:  5:35 AM  The patient is ready for discharge. The patient's signs, symptoms, diagnosis, and discharge instruction have been discussed and the patient has conveyed their understanding.  The patient is to follow up as recommended or return to the ER should their symptoms worsen. Plan has been discussed and the patient is in agreement. Written by Henrik Perez ED Scribe, as dictated by George Webb MD    PLAN:  1. Current Discharge Medication List        2. Follow-up Information     Follow up With Details Comments [address], [address]   [address] [facility] [place]   [phone]      [facility] EMERGENCY DEPT  As needed, If symptoms worsen [address]  [address]  [phone]        Return to ED if worse     Diagnosis     Clinical Impression:   1. Acute chest pain    2. Acute cervical radiculopathy    3. Strain of neck muscle, initial encounter    4. Atypical chest pain        Attestations:    Attestation: This note is prepared by Lopez Hodge. Chris, acting as Scribe for George Webb MD.      George Webb MD: The scribe's documentation has been prepared under my direction and personally reviewed by me in its entirety. I confirm that the note above accurately reflects all work, treatment, procedures, and medical decision making performed by me. This note will not be viewable in [address]

## 2018-06-29 NOTE — DISCHARGE INSTRUCTIONS
Thank you! Thank you for allowing us to provide you with excellent care today. We hope we addressed all of your concerns and needs. We strive to provide excellent quality care in the Emergency Department. You may receive a survey after your visit to evaluate the care you were provided. Should you receive a survey from us, we invite you to share your experience and tell us what made it excellent. It was a pleasure serving you, we invite you to share your experience with us, in our pursuit for excellence, should you be selected to receive a survey. If you feel that you have not received excellent quality care or timely care, please ask to speak to the nurse manager. Please choose us in the future for your continued health care needs. ------------------------------------------------------------------------------------------------------------  The exam and treatment you received in the Emergency Department were for an urgent problem and are not intended as complete care. It is important that you follow up with a doctor, nurse practitioner, or physician assistant for ongoing care. If your symptoms become worse or you do not improve as expected and you are unable to reach your usual health care provider, you should return to the Emergency Department. We are available 24 hours a day. Please take your discharge instructions with you when you go to your follow-up appointment. If you have any problem arranging a follow-up appointment, contact the Emergency Department immediately. If a prescription has been provided, please have it filled as soon as possible to prevent a delay in treatment. Read the entire medication instruction sheet provided to you by the pharmacy. If you have any questions or reservations about taking the medication due to side effects or interactions with other medications, please call your primary care physician or contact the ER to speak with the charge nurse.      Make an appointment with your family doctor or the physician you were referred to for follow-up of this visit as instructed on your discharge paperwork, as this is mandatory follow-up. Return to the ER if you are unable to be seen or if you are unable to be seen in a timely manner. If you have any problem arranging the follow-up visit, contact the Emergency Department immediately. Back Pain: Care Instructions  Your Care Instructions    Back pain has many possible causes. It is often related to problems with muscles and ligaments of the back. It may also be related to problems with the nerves, discs, or bones of the back. Moving, lifting, standing, sitting, or sleeping in an awkward way can strain the back. Sometimes you don't notice the injury until later. Arthritis is another common cause of back pain. Although it may hurt a lot, back pain usually improves on its own within several weeks. Most people recover in 12 weeks or less. Using good home treatment and being careful not to stress your back can help you feel better sooner. Follow-up care is a key part of your treatment and safety. Be sure to make and go to all appointments, and call your doctor if you are having problems. It's also a good idea to know your test results and keep a list of the medicines you take. How can you care for yourself at home? · Sit or lie in positions that are most comfortable and reduce your pain. Try one of these positions when you lie down:  ¨ Lie on your back with your knees bent and supported by large pillows. ¨ Lie on the floor with your legs on the seat of a sofa or chair. Dontae Bulgarian on your side with your knees and hips bent and a pillow between your legs. ¨ Lie on your stomach if it does not make pain worse. · Do not sit up in bed, and avoid soft couches and twisted positions. Bed rest can help relieve pain at first, but it delays healing. Avoid bed rest after the first day of back pain.   · Change positions every 30 minutes. If you must sit for long periods of time, take breaks from sitting. Get up and walk around, or lie in a comfortable position. · Try using a heating pad on a low or medium setting for 15 to 20 minutes every 2 or 3 hours. Try a warm shower in place of one session with the heating pad. · You can also try an ice pack for 10 to 15 minutes every 2 to 3 hours. Put a thin cloth between the ice pack and your skin. · Take pain medicines exactly as directed. ¨ If the doctor gave you a prescription medicine for pain, take it as prescribed. ¨ If you are not taking a prescription pain medicine, ask your doctor if you can take an over-the-counter medicine. · Take short walks several times a day. You can start with 5 to 10 minutes, 3 or 4 times a day, and work up to longer walks. Walk on level surfaces and avoid hills and stairs until your back is better. · Return to work and other activities as soon as you can. Continued rest without activity is usually not good for your back. · To prevent future back pain, do exercises to stretch and strengthen your back and stomach. Learn how to use good posture, safe lifting techniques, and proper body mechanics. When should you call for help? Call your doctor now or seek immediate medical care if:  ? · You have new or worsening numbness in your legs. ? · You have new or worsening weakness in your legs. (This could make it hard to stand up.)   ? · You lose control of your bladder or bowels. ? Watch closely for changes in your health, and be sure to contact your doctor if:  ? · Your pain gets worse. ? · You are not getting better after 2 weeks. Where can you learn more? Go to http://sonal-jose elias.info/. Enter Y409 in the search box to learn more about \"Back Pain: Care Instructions. \"  Current as of: March 21, 2017  Content Version: 11.4  © 8904-2059 "[employer].  Care instructions adapted under license by [employer] (which disclaims liability or warranty for this information). If you have questions about a medical condition or this instruction, always ask your healthcare professional. [address] any warranty or liability for your use of this information.

## 2018-06-29 NOTE — ED NOTES
Pt presents to ED for evaluation of CP that started roughly at 0230 today - pt indicates that the pain woke her up in sleep after pt was lying down for 20 min. Describes the pain as \"sharp and radiating to my shoulders and neck, mostly staying in my shoulders. \" Denies any SOB, but does indicate she was cold, but at the same time was clammy, denies any vomiting. Pt last took Excedrin Migraine to help relief the pain, with little to none relief. Accompanied by mother who is at bedside.

## 2018-08-30 DIAGNOSIS — M79.7 FIBROMYALGIA: ICD-10-CM

## 2018-08-30 RX ORDER — DULOXETIN HYDROCHLORIDE 30 MG/1
60 CAPSULE, DELAYED RELEASE ORAL DAILY
Qty: 180 CAP | Refills: 3 | Status: SHIPPED | OUTPATIENT
Start: 2018-08-30 | End: 2018-09-05 | Stop reason: SDUPTHER

## 2018-08-30 NOTE — TELEPHONE ENCOUNTER
Pharmacy is requesting a refill on med    Requested Prescriptions     Pending Prescriptions Disp Refills    DULoxetine (CYMBALTA) 30 mg capsule 180 Cap 3     Sig: Take 2 Caps by mouth daily.

## 2018-09-05 ENCOUNTER — TELEPHONE (OUTPATIENT)
Dept: FAMILY MEDICINE CLINIC | Age: 33
End: 2018-09-05

## 2018-09-05 DIAGNOSIS — M79.7 FIBROMYALGIA: ICD-10-CM

## 2018-09-05 RX ORDER — DULOXETIN HYDROCHLORIDE 30 MG/1
30 CAPSULE, DELAYED RELEASE ORAL DAILY
Qty: 90 CAP | Refills: 3 | Status: SHIPPED | OUTPATIENT
Start: 2018-09-05 | End: 2019-08-23 | Stop reason: SDUPTHER

## 2018-09-05 NOTE — TELEPHONE ENCOUNTER
Called Ms. Shields and she said she went to 30 mg once a day because she felt like 30 mg twice per day was to much for her.  She would like the Rx cut back to 30 mg daily

## 2018-09-05 NOTE — TELEPHONE ENCOUNTER
Patient's pharmacy is requesting that we switch from Cymbalta 30 mg twice daily to Cymbalta 60 mg daily. We can do a prior auth for the 30 mg twice daily that she has been taking. She had previously expressed the desire to split the dose but that was many months ago.     Please check with patient on whether she would prefer Cymbalta 30 mg twice daily or Cymbalta 60 mg daily    If she chooses 30 mg capsule we will need to do a prior authorization

## 2018-09-18 ENCOUNTER — TELEPHONE (OUTPATIENT)
Dept: FAMILY MEDICINE CLINIC | Age: 33
End: 2018-09-18

## 2018-09-18 NOTE — TELEPHONE ENCOUNTER
9/18/18 Called  PA initiated for Duloxetine 30 mg caps with PA rep Morgan SEYMOUR. Outcome will be faxed to office with in 72 hours, letter will be mailed to patient [address] [place] [address] # [phone].

## 2019-04-18 ENCOUNTER — TELEPHONE (OUTPATIENT)
Dept: FAMILY MEDICINE CLINIC | Age: 34
End: 2019-04-18

## 2019-04-18 NOTE — TELEPHONE ENCOUNTER
4/18/19 Called 4  PA initiated with PA rep Kate GRIDER,for Duloxetine HCL 30 mg capsule. Refrence #: [id number] around time for decision to be faxed to office is up to 100 hours. Claim pending outcome.

## 2019-04-19 NOTE — TELEPHONE ENCOUNTER
Received response from Mague smith Cymbalta stating she needed to try and failed 2 other agents. She has tried and failed Flexeril and Paxil.   These are among the alternatives listed    Please see if we can appeal this

## 2019-04-30 ENCOUNTER — TELEPHONE (OUTPATIENT)
Dept: FAMILY MEDICINE CLINIC | Age: 34
End: 2019-04-30

## 2019-08-23 DIAGNOSIS — M79.7 FIBROMYALGIA: ICD-10-CM

## 2019-08-23 RX ORDER — DULOXETIN HYDROCHLORIDE 30 MG/1
CAPSULE, DELAYED RELEASE ORAL
Qty: 90 CAP | Refills: 0 | Status: SHIPPED | OUTPATIENT
Start: 2019-08-23

## 2020-05-29 DIAGNOSIS — M79.7 FIBROMYALGIA: ICD-10-CM

## 2020-06-01 RX ORDER — DULOXETIN HYDROCHLORIDE 30 MG/1
CAPSULE, DELAYED RELEASE ORAL
Qty: 90 CAP | Refills: 0 | OUTPATIENT
Start: 2020-06-01

## 2020-06-01 NOTE — TELEPHONE ENCOUNTER
Declined refill. Last appointment was 2 years ago.   I actually refilled a limited supply 1 year ago and no appointment has been made    Needs appointment if she is still coming here

## 2020-06-02 NOTE — TELEPHONE ENCOUNTER
Called pt, verified name and . Pt stated that she is no longer a pt here. She is going to a new pcp.

## 2021-01-02 ENCOUNTER — APPOINTMENT (OUTPATIENT)
Dept: CT IMAGING | Age: 36
End: 2021-01-02
Attending: PHYSICIAN ASSISTANT
Payer: COMMERCIAL

## 2021-01-02 ENCOUNTER — HOSPITAL ENCOUNTER (EMERGENCY)
Age: 36
Discharge: HOME | End: 2021-01-02
Attending: EMERGENCY MEDICINE
Payer: COMMERCIAL

## 2021-01-02 VITALS
BODY MASS INDEX: 27.73 KG/M2 | SYSTOLIC BLOOD PRESSURE: 139 MMHG | OXYGEN SATURATION: 100 % | HEART RATE: 97 BPM | TEMPERATURE: 98.9 F | HEIGHT: 65 IN | WEIGHT: 166.45 LBS | RESPIRATION RATE: 16 BRPM | DIASTOLIC BLOOD PRESSURE: 94 MMHG

## 2021-01-02 DIAGNOSIS — R51.9 CHRONIC NONINTRACTABLE HEADACHE, UNSPECIFIED HEADACHE TYPE: ICD-10-CM

## 2021-01-02 DIAGNOSIS — S09.90XA CLOSED HEAD INJURY, INITIAL ENCOUNTER: Primary | ICD-10-CM

## 2021-01-02 DIAGNOSIS — G89.29 CHRONIC NONINTRACTABLE HEADACHE, UNSPECIFIED HEADACHE TYPE: ICD-10-CM

## 2021-01-02 PROCEDURE — 99284 EMERGENCY DEPT VISIT MOD MDM: CPT

## 2021-01-02 PROCEDURE — 70450 CT HEAD/BRAIN W/O DYE: CPT
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
## 2021-01-02 NOTE — ED PROVIDER NOTES
EMERGENCY DEPARTMENT HISTORY AND PHYSICAL EXAM      Date: 1/2/2021  Patient Name: Marie Shields    History of Presenting Illness     Chief Complaint   Patient presents with    Eye Pain     rt eye pain x 1 week. pt had a concussion on 11/06 and reports that her pupils are intermittently  not equal.         History Provided By: Patient    HPI: Miki Hunt, 37 y.o. female of migraines, fibromyalgia and others as below presents ambulatory to the ED with cc of several weeks or longer of 7 out of 10 intermittent, headache pain for which she has noticed that her right pupil seems unequal at times. She tells me she fell backwards on November 6, striking her head. There was no loss of consciousness and she recalls all events after. She was seen several days after that fall by her neurologist (she has a neurologist due to her chronic migraines) who performed an exam and did not make any specific recommendations. There has been no fever lately. There has been no injury since the fall back on November 6. She tells me that over the past week or so she has noticed that her right pupil seems a different size. There has been no vision loss or blurry vision that is reported. There has been no chest pain or shortness of breath. No weakness or numbness that she reports. There are no other complaints, changes, or physical findings at this time. PCP: Pam Tapia MD    Current Outpatient Medications   Medication Sig Dispense Refill    DULoxetine (CYMBALTA) 30 mg capsule TAKE ONE CAPSULE BY MOUTH DAILY 90 Cap 0    lamoTRIgine (LAMICTAL) 150 mg tablet Take 150 mg by mouth daily. Indications: Migraine prevention      ketorolac (TORADOL) 10 mg tablet Take 1 Tab by mouth every six (6) hours as needed for Pain. 20 Tab 0    methocarbamol (ROBAXIN-750) 750 mg tablet Take 1 Tab by mouth four (4) times daily.  20 Tab 0    methylPREDNISolone (MEDROL, MARINO,) 4 mg tablet Take as prescribed 1 Dose Pack 0    lidocaine (LIDODERM) 5 % Apply patch to the affected area for 12 hours a day and remove for 12 hours a day. 1 Each 0    butalbital-acetaminophen-caffeine (FIORICET, ESGIC) -40 mg per tablet TAKE 1 TABLET BY MOUTH EVERY 6 HOURS FOR 30 DAYS  0    mupirocin calcium (BACTROBAN) 2 % topical cream Apply  to affected area two (2) times a day. 30 g 2    lamoTRIgine (LAMICTAL) 100 mg tablet TAKE 1 TABLET BY MOUTH EVERY DAY  3    triamcinolone acetonide (KENALOG) 0.1 % ointment Apply 0.1 g to affected area two (2) times a day. 1    colestipol (COLESTID) 1 gram tablet Take 1 g by mouth daily. 0    dicyclomine (BENTYL) 10 mg capsule Take 10 mg by mouth daily. 5    Magnesium Oxide 500 mg cap Take 500 mg by mouth daily. 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     HX OTHER MEDICAL     LGA    Migraine     Other ill-defined conditions(799.89)     rapid heart rate     SVT (supraventricular tachycardia) (HCC) 9/16/2014    SVT (supraventricular tachycardia) (HCC) 02/01/2008    Thyroid disease     mild hypothyroidism       Past Surgical History:  Past Surgical History:   Procedure Laterality Date    COLONOSCOPY N/A 5/26/2016    COLONOSCOPY / BIOPSY  performed by Charissa Zelaya MD at [address]  5/26/2016         HX CHOLECYSTECTOMY      HX OTHER SURGICAL      D&C    HX OTHER SURGICAL      lap arsen March 10    UPPER GI ENDOSCOPY,BIOPSY  11/24/2015            Family History:  Family History   Problem Relation Age of Onset    Asthma Mother     SLE Mother     Migraines Father     Asthma Brother     Other Brother         autism    No Known Problems Brother     No Known Problems Daughter     No Known Problems Son     Cancer Paternal Aunt     Stroke Maternal Grandmother     Diabetes Paternal Grandfather     Heart Disease Paternal Grandfather     Heart Disease Maternal Grandfather     Cancer Maternal Grandfather         lung cancer       Social History:  Social History     Tobacco Use    Smoking status: Never Smoker    Smokeless tobacco: Never Used   Substance Use Topics    Alcohol use: Yes     Alcohol/week: 0.0 standard drinks     Comment: social    Drug use: No       Allergies: Allergies   Allergen Reactions    Latex Itching    Latex Rash and Itching    Morphine Other (comments)     abd pain, nausea     Review of Systems   Review of Systems   Constitutional: Negative for fatigue and fever. HENT: Negative for ear pain and sore throat. Eyes: Negative for pain, redness and visual disturbance. Intermittent unequal pupil size   Respiratory: Negative for cough and shortness of breath. Cardiovascular: Negative for chest pain and palpitations. Gastrointestinal: Negative for abdominal pain, nausea and vomiting. Genitourinary: Negative for dysuria, frequency and urgency. Musculoskeletal: Negative for back pain, gait problem, neck pain and neck stiffness. Skin: Negative for rash and wound. Neurological: Positive for headaches. Negative for dizziness, weakness, light-headedness and numbness. Physical Exam   Physical Exam  Vitals signs and nursing note reviewed. Constitutional:       General: She is not in acute distress. Appearance: She is well-developed. She is not toxic-appearing. HENT:      Head: Normocephalic and atraumatic. Jaw: No trismus. Right Ear: External ear normal.      Left Ear: External ear normal.      Nose: Nose normal.      Mouth/Throat:      Pharynx: Uvula midline. Eyes:      General: Lids are normal. Vision grossly intact. Gaze aligned appropriately. No scleral icterus. Conjunctiva/sclera: Conjunctivae normal.      Pupils: Pupils are equal, round, and reactive to light. Comments:   No periorbital edema or erythema  Sclera are white bilaterally  No conjunctival injection or chemosis bilaterally  Pupils are equal at 4 mm, round and reactive to light both directly and consensually. Crystal Nimo EOMI   Neck:      Musculoskeletal: Full passive range of motion without pain and normal range of motion. Cardiovascular:      Rate and Rhythm: Normal rate and regular rhythm. Pulmonary:      Effort: Pulmonary effort is normal. No tachypnea, accessory muscle usage or respiratory distress. Breath sounds: No decreased breath sounds or wheezing. Abdominal:      Palpations: Abdomen is soft. Tenderness: There is no abdominal tenderness. Musculoskeletal: Normal range of motion. Skin:     Findings: No rash. Neurological:      Mental Status: She is alert and oriented to person, place, and time. She is not disoriented. GCS: GCS eye subscore is 4. GCS verbal subscore is 5. GCS motor subscore is 6. Cranial Nerves: No cranial nerve deficit. Comments:   Normal speech and mentation  No focal neurological deficits   Psychiatric:         Speech: Speech normal.       Diagnostic Study Results     Labs -   No results found for this or any previous visit (from the past 12 hour(s)). Radiologic Studies -   CT HEAD WO CONT   Final Result   IMPRESSION:  No significant abnormalities. CT Results  (Last 48 hours)               01/02/21 1622  CT HEAD WO CONT Final result    Impression:  IMPRESSION:  No significant abnormalities. Narrative:  EXAMINATION:  CT HEAD WO CONT       CLINICAL INFORMATION:  head injury   COMPARISON:  CT of 9/4/2013    TECHNIQUE: Routine axial head CT was performed. IV contrast was not   administered. Sagittal and coronal reconstructions were generated.      CT dose reduction was achieved through use of a standardized protocol tailored   for this examination and automatic exposure control for dose modulation. FINDINGS:   No acute infarct, hemorrhage or mass. VENTRICULAR SYSTEM:  Normal for age. BASAL CISTERNS:  Patent. BRAIN PARENCHYMA:  No significant abnormalities. MIDLINE SHIFT:  None. CALVARIUM/ SKULL BASE: Intact. PARANASAL SINUSES AND MASTOID AIR CELLS: Clear. VISUALIZED ORBITS: No significant abnormalities. SELLA: No enlargement. CXR Results  (Last 48 hours)    None        Medical Decision Making   I am the first provider for this patient. I reviewed the vital signs, available nursing notes, past medical history, past surgical history, family history and social history. Vital Signs-Reviewed the patient's vital signs. Patient Vitals for the past 12 hrs:   Temp Pulse Resp BP SpO2   01/02/21 1601  97      01/02/21 1407 98.9 °F (37.2 °C) (!) 127 16 (!) 139/94 100 %       Pulse Oximetry Analysis - 100% on RA    Records Reviewed: Nursing Notes, Old Medical Records, Previous Radiology Studies, Previous Laboratory Studies and     Provider Notes (Medical Decision Making):   DDx: Closed head injury, ICH, anisocoria, atypical migraine    5:54 PM  I have informed the patient about her normal CT results. She has a reassuring exam no focal neurological deficits and no pupillary diameter discrepancy. She tells me she has headache medicine and does not require any additional prescriptions. I believe reasonable to defer additional testing in favor of outpatient referral to ophthalmology. He is also encouraged to follow-up with her neurologist.  Return precautions. ED Course:   Initial assessment performed. The patients presenting problems have been discussed, and they are in agreement with the care plan formulated and outlined with them. I have encouraged them to ask questions as they arise throughout their visit. Disposition:  Discharge    PLAN:  1.    Current Discharge Medication List 2.   Follow-up Information     Follow up With Specialties Details Why [facility]  Schedule an appointment as soon as possible for a visit  OPHTHALMOLOGY: call to schedule follow up [address] [place] [address] [phone]        Return to ED if worse     Diagnosis     Clinical Impression:   1. Closed head injury, initial encounter    2.  Chronic nonintractable headache, unspecified headache type

## 2022-03-18 PROBLEM — L30.9 ECZEMA: Status: ACTIVE | Noted: 2017-03-24

## 2022-03-19 PROBLEM — L30.1 CHRONIC VESICULAR HAND DERMATITIS: Status: ACTIVE | Noted: 2017-07-14

## 2022-03-19 PROBLEM — G43.909 MIGRAINE SYNDROME: Status: ACTIVE | Noted: 2017-07-14

## 2022-03-20 PROBLEM — R10.9 CHRONIC ABDOMINAL PAIN: Status: ACTIVE | Noted: 2017-07-14

## 2022-03-20 PROBLEM — G89.29 CHRONIC ABDOMINAL PAIN: Status: ACTIVE | Noted: 2017-07-14

## 2024-02-07 ENCOUNTER — HOSPITAL ENCOUNTER (OUTPATIENT)
Age: 39
Discharge: HOME | End: 2024-02-10
Payer: COMMERCIAL

## 2024-02-07 DIAGNOSIS — R41.3 MEMORY LOSS: ICD-10-CM

## 2024-02-07 PROCEDURE — A9579 GAD-BASE MR CONTRAST NOS,1ML: HCPCS | Performed by: STUDENT IN AN ORGANIZED HEALTH CARE EDUCATION/TRAINING PROGRAM

## 2024-02-07 PROCEDURE — 70553 MRI BRAIN STEM W/O & W/DYE: CPT

## 2024-02-07 PROCEDURE — 6360000004 HC RX CONTRAST MEDICATION: Performed by: STUDENT IN AN ORGANIZED HEALTH CARE EDUCATION/TRAINING PROGRAM

## 2024-02-07 RX ADMIN — GADOTERIDOL 15 ML: 279.3 INJECTION, SOLUTION INTRAVENOUS at 13:53

## 2024-07-09 ENCOUNTER — HOSPITAL ENCOUNTER (EMERGENCY)
Facility: HOSPITAL | Age: 39
Discharge: HOME | End: 2024-07-09
Attending: EMERGENCY MEDICINE
Payer: COMMERCIAL

## 2024-07-09 ENCOUNTER — APPOINTMENT (OUTPATIENT)
Facility: HOSPITAL | Age: 39
End: 2024-07-09
Payer: COMMERCIAL

## 2024-07-09 VITALS
DIASTOLIC BLOOD PRESSURE: 92 MMHG | OXYGEN SATURATION: 96 % | BODY MASS INDEX: 28.83 KG/M2 | HEIGHT: 65 IN | WEIGHT: 173.06 LBS | RESPIRATION RATE: 16 BRPM | TEMPERATURE: 98.2 F | SYSTOLIC BLOOD PRESSURE: 126 MMHG | HEART RATE: 97 BPM

## 2024-07-09 DIAGNOSIS — S16.1XXA STRAIN OF NECK MUSCLE, INITIAL ENCOUNTER: ICD-10-CM
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
## 2024-07-09 DIAGNOSIS — M54.9 UPPER BACK PAIN ON LEFT SIDE: ICD-10-CM

## 2024-07-09 DIAGNOSIS — S06.0XAA CONCUSSION WITH UNKNOWN LOSS OF CONSCIOUSNESS STATUS, INITIAL ENCOUNTER: ICD-10-CM

## 2024-07-09 LAB
ALBUMIN SERPL-MCNC: 3.7 G/DL (ref 3.5–5)
ALBUMIN/GLOB SERPL: 0.9 (ref 1.1–2.2)
ALP SERPL-CCNC: 124 U/L (ref 45–117)
ALT SERPL-CCNC: 70 U/L (ref 12–78)
AMPHET UR QL SCN: NEGATIVE
ANION GAP SERPL CALC-SCNC: 6 MMOL/L (ref 5–15)
APPEARANCE UR: CLEAR
AST SERPL-CCNC: 26 U/L (ref 15–37)
BACTERIA URNS QL MICRO: NEGATIVE /HPF
BARBITURATES UR QL SCN: NEGATIVE
BASOPHILS # BLD: 0 K/UL (ref 0–0.1)
BASOPHILS NFR BLD: 0 % (ref 0–1)
BENZODIAZ UR QL: NEGATIVE
BILIRUB SERPL-MCNC: 1 MG/DL (ref 0.2–1)
BILIRUB UR QL: NEGATIVE
BUN SERPL-MCNC: 10 MG/DL (ref 6–20)
BUN/CREAT SERPL: 11 (ref 12–20)
CALCIUM SERPL-MCNC: 9 MG/DL (ref 8.5–10.1)
CANNABINOIDS UR QL SCN: NEGATIVE
CHLORIDE SERPL-SCNC: 108 MMOL/L (ref 97–108)
CO2 SERPL-SCNC: 25 MMOL/L (ref 21–32)
COCAINE UR QL SCN: NEGATIVE
COLOR UR: ABNORMAL
CREAT SERPL-MCNC: 0.93 MG/DL (ref 0.55–1.02)
DIFFERENTIAL METHOD BLD: ABNORMAL
EOSINOPHIL # BLD: 0.1 K/UL (ref 0–0.4)
EOSINOPHIL NFR BLD: 1 % (ref 0–7)
EPITH CASTS URNS QL MICRO: ABNORMAL /LPF
ERYTHROCYTE [DISTWIDTH] IN BLOOD BY AUTOMATED COUNT: 12.2 % (ref 11.5–14.5)
ETHANOL SERPL-MCNC: <10 MG/DL (ref 0–0.08)
GLOBULIN SER CALC-MCNC: 4.1 G/DL (ref 2–4)
GLUCOSE SERPL-MCNC: 113 MG/DL (ref 65–100)
GLUCOSE UR STRIP.AUTO-MCNC: NEGATIVE MG/DL
HCG UR QL: NEGATIVE
HCT VFR BLD AUTO: 35.2 % (ref 35–47)
HGB BLD-MCNC: 12 G/DL (ref 11.5–16)
HGB UR QL STRIP: NEGATIVE
HYALINE CASTS URNS QL MICRO: ABNORMAL /LPF (ref 0–2)
IMM GRANULOCYTES # BLD AUTO: 0 K/UL (ref 0–0.04)
IMM GRANULOCYTES NFR BLD AUTO: 0 % (ref 0–0.5)
KETONES UR QL STRIP.AUTO: NEGATIVE MG/DL
LEUKOCYTE ESTERASE UR QL STRIP.AUTO: ABNORMAL
LYMPHOCYTES # BLD: 1.8 K/UL (ref 0.8–3.5)
LYMPHOCYTES NFR BLD: 17 % (ref 12–49)
Lab: NORMAL
MCH RBC QN AUTO: 29.3 PG (ref 26–34)
MCHC RBC AUTO-ENTMCNC: 34.1 G/DL (ref 30–36.5)
MCV RBC AUTO: 85.9 FL (ref 80–99)
METHADONE UR QL: NEGATIVE
MONOCYTES # BLD: 0.5 K/UL (ref 0–1)
MONOCYTES NFR BLD: 5 % (ref 5–13)
NEUTS SEG # BLD: 8.5 K/UL (ref 1.8–8)
NEUTS SEG NFR BLD: 77 % (ref 32–75)
NITRITE UR QL STRIP.AUTO: NEGATIVE
NRBC # BLD: 0 K/UL (ref 0–0.01)
NRBC BLD-RTO: 0 PER 100 WBC
OPIATES UR QL: NEGATIVE
PCP UR QL: NEGATIVE
PH UR STRIP: 6.5 (ref 5–8)
PLATELET # BLD AUTO: 323 K/UL (ref 150–400)
PMV BLD AUTO: 9.9 FL (ref 8.9–12.9)
POTASSIUM SERPL-SCNC: 3.5 MMOL/L (ref 3.5–5.1)
PROT SERPL-MCNC: 7.8 G/DL (ref 6.4–8.2)
PROT UR STRIP-MCNC: NEGATIVE MG/DL
RBC # BLD AUTO: 4.1 M/UL (ref 3.8–5.2)
RBC #/AREA URNS HPF: ABNORMAL /HPF (ref 0–5)
SODIUM SERPL-SCNC: 139 MMOL/L (ref 136–145)
SP GR UR REFRACTOMETRY: <1.005
TROPONIN I SERPL HS-MCNC: 4 NG/L (ref 0–51)
URINE CULTURE IF INDICATED: ABNORMAL
UROBILINOGEN UR QL STRIP.AUTO: 0.2 EU/DL (ref 0.2–1)
WBC # BLD AUTO: 11 K/UL (ref 3.6–11)
WBC URNS QL MICRO: ABNORMAL /HPF (ref 0–4)

## 2024-07-09 PROCEDURE — 85025 COMPLETE CBC W/AUTO DIFF WBC: CPT

## 2024-07-09 PROCEDURE — 96374 THER/PROPH/DIAG INJ IV PUSH: CPT

## 2024-07-09 PROCEDURE — 80307 DRUG TEST PRSMV CHEM ANLYZR: CPT

## 2024-07-09 PROCEDURE — 93005 ELECTROCARDIOGRAM TRACING: CPT | Performed by: EMERGENCY MEDICINE

## 2024-07-09 PROCEDURE — 6360000002 HC RX W HCPCS: Performed by: EMERGENCY MEDICINE

## 2024-07-09 PROCEDURE — 84484 ASSAY OF TROPONIN QUANT: CPT

## 2024-07-09 PROCEDURE — 82077 ASSAY SPEC XCP UR&BREATH IA: CPT

## 2024-07-09 PROCEDURE — 80053 COMPREHEN METABOLIC PANEL: CPT

## 2024-07-09 PROCEDURE — 71045 X-RAY EXAM CHEST 1 VIEW: CPT

## 2024-07-09 PROCEDURE — 70450 CT HEAD/BRAIN W/O DYE: CPT

## 2024-07-09 PROCEDURE — 81025 URINE PREGNANCY TEST: CPT

## 2024-07-09 PROCEDURE — 81001 URINALYSIS AUTO W/SCOPE: CPT

## 2024-07-09 PROCEDURE — 72125 CT NECK SPINE W/O DYE: CPT

## 2024-07-09 PROCEDURE — 99285 EMERGENCY DEPT VISIT HI MDM: CPT

## 2024-07-09 PROCEDURE — 36415 COLL VENOUS BLD VENIPUNCTURE: CPT

## 2024-07-09 RX ORDER — METHOCARBAMOL 750 MG/1
750 TABLET, FILM COATED ORAL 4 TIMES DAILY PRN
Qty: 20 TABLET | Refills: 0 | Status: SHIPPED | OUTPATIENT
Start: 2024-07-09 | End: 2024-07-19

## 2024-07-09 RX ORDER — KETOROLAC TROMETHAMINE 30 MG/ML
30 INJECTION, SOLUTION INTRAMUSCULAR; INTRAVENOUS
Status: COMPLETED | OUTPATIENT
Start: 2024-07-09 | End: 2024-07-09

## 2024-07-09 RX ORDER — IBUPROFEN 600 MG/1
600 TABLET ORAL EVERY 6 HOURS PRN
Qty: 15 TABLET | Refills: 0 | Status: SHIPPED | OUTPATIENT
Start: 2024-07-09

## 2024-07-09 RX ADMIN — KETOROLAC TROMETHAMINE 30 MG: 30 INJECTION, SOLUTION INTRAMUSCULAR at 16:36

## 2024-07-09 ASSESSMENT — PAIN - FUNCTIONAL ASSESSMENT: PAIN_FUNCTIONAL_ASSESSMENT: 0-10

## 2024-07-09 ASSESSMENT — PAIN DESCRIPTION - LOCATION: LOCATION: NECK

## 2024-07-09 ASSESSMENT — PAIN DESCRIPTION - PAIN TYPE: TYPE: ACUTE PAIN

## 2024-07-09 NOTE — ED PROVIDER NOTES
EU/dL    Nitrite, Urine Negative NEG      Leukocyte Esterase, Urine TRACE (A) NEG      Urine Culture if Indicated CULTURE NOT INDICATED BY UA RESULT CNI      WBC, UA 0-4 0 - 4 /hpf    RBC, UA 0-5 0 - 5 /hpf    Epithelial Cells, UA MODERATE (A) FEW /lpf    BACTERIA, URINE Negative NEG /hpf    Hyaline Casts, UA 0-2 0 - 2 /lpf   Urine Drug Screen    Collection Time: 07/09/24  3:58 PM   Result Value Ref Range    Amphetamine, Urine Negative NEG      Barbiturates, Urine Negative NEG      Benzodiazepines, Urine Negative NEG      Cocaine, Urine Negative NEG      Methadone, Urine Negative NEG      Opiates, Urine Negative NEG      Phencyclidine, Urine Negative NEG      THC, TH-Cannabinol, Urine Negative NEG      Comments: (NOTE)    POC Pregnancy Urine Qual    Collection Time: 07/09/24  3:59 PM   Result Value Ref Range    Preg Test, Ur Negative NEG     CBC with Auto Differential    Collection Time: 07/09/24  4:23 PM   Result Value Ref Range    WBC 11.0 3.6 - 11.0 K/uL    RBC 4.10 3.80 - 5.20 M/uL    Hemoglobin 12.0 11.5 - 16.0 g/dL    Hematocrit 35.2 35.0 - 47.0 %    MCV 85.9 80.0 - 99.0 FL    MCH 29.3 26.0 - 34.0 PG    MCHC 34.1 30.0 - 36.5 g/dL    RDW 12.2 11.5 - 14.5 %    Platelets 323 150 - 400 K/uL    MPV 9.9 8.9 - 12.9 FL    Nucleated RBCs 0.0 0  WBC    nRBC 0.00 0.00 - 0.01 K/uL    Neutrophils % 77 (H) 32 - 75 %    Lymphocytes % 17 12 - 49 %    Monocytes % 5 5 - 13 %    Eosinophils % 1 0 - 7 %    Basophils % 0 0 - 1 %    Immature Granulocytes % 0 0.0 - 0.5 %    Neutrophils Absolute 8.5 (H) 1.8 - 8.0 K/UL    Lymphocytes Absolute 1.8 0.8 - 3.5 K/UL    Monocytes Absolute 0.5 0.0 - 1.0 K/UL    Eosinophils Absolute 0.1 0.0 - 0.4 K/UL    Basophils Absolute 0.0 0.0 - 0.1 K/UL    Immature Granulocytes Absolute 0.0 0.00 - 0.04 K/UL    Differential Type AUTOMATED     Comprehensive Metabolic Panel    Collection Time: 07/09/24  4:23 PM   Result Value Ref Range    Sodium 139 136 - 145 mmol/L    Potassium 3.5 3.5 - 5.1 mmol/L

## 2024-07-10 LAB
EKG ATRIAL RATE: 107 BPM
EKG DIAGNOSIS: NORMAL
EKG P AXIS: 54 DEGREES
EKG P-R INTERVAL: 140 MS
EKG Q-T INTERVAL: 334 MS
EKG QRS DURATION: 86 MS
EKG QTC CALCULATION (BAZETT): 445 MS
EKG R AXIS: 6 DEGREES
EKG T AXIS: 45 DEGREES
EKG VENTRICULAR RATE: 107 BPM

## 2024-07-25 ENCOUNTER — APPOINTMENT (OUTPATIENT)
Facility: HOSPITAL | Age: 39
End: 2024-07-25
Payer: OTHER MISCELLANEOUS

## 2024-07-25 ENCOUNTER — HOSPITAL ENCOUNTER (EMERGENCY)
Facility: HOSPITAL | Age: 39
Discharge: HOME | End: 2024-07-25
Attending: EMERGENCY MEDICINE
Payer: OTHER MISCELLANEOUS

## 2024-07-25 VITALS
HEART RATE: 88 BPM | SYSTOLIC BLOOD PRESSURE: 120 MMHG | OXYGEN SATURATION: 96 % | TEMPERATURE: 98.2 F | RESPIRATION RATE: 16 BRPM | WEIGHT: 169.97 LBS | DIASTOLIC BLOOD PRESSURE: 84 MMHG | BODY MASS INDEX: 28.29 KG/M2

## 2024-07-25 DIAGNOSIS — S06.0X0D CONCUSSION WITHOUT LOSS OF CONSCIOUSNESS, SUBSEQUENT ENCOUNTER: Primary | ICD-10-CM

## 2024-07-25 LAB
ALBUMIN SERPL-MCNC: 3.8 G/DL (ref 3.5–5)
ALBUMIN/GLOB SERPL: 0.9 (ref 1.1–2.2)
ALP SERPL-CCNC: 102 U/L (ref 45–117)
ALT SERPL-CCNC: 41 U/L (ref 12–78)
ANION GAP SERPL CALC-SCNC: 3 MMOL/L (ref 5–15)
AST SERPL-CCNC: 14 U/L (ref 15–37)
BASOPHILS # BLD: 0 K/UL (ref 0–0.1)
BASOPHILS NFR BLD: 1 % (ref 0–1)
BILIRUB SERPL-MCNC: 0.6 MG/DL (ref 0.2–1)
BUN SERPL-MCNC: 10 MG/DL (ref 6–20)
BUN/CREAT SERPL: 12 (ref 12–20)
CALCIUM SERPL-MCNC: 9.4 MG/DL (ref 8.5–10.1)
CHLORIDE SERPL-SCNC: 108 MMOL/L (ref 97–108)
CO2 SERPL-SCNC: 27 MMOL/L (ref 21–32)
CREAT SERPL-MCNC: 0.85 MG/DL (ref 0.55–1.02)
DIFFERENTIAL METHOD BLD: NORMAL
EOSINOPHIL # BLD: 0.1 K/UL (ref 0–0.4)
EOSINOPHIL NFR BLD: 2 % (ref 0–7)
ERYTHROCYTE [DISTWIDTH] IN BLOOD BY AUTOMATED COUNT: 12.3 % (ref 11.5–14.5)
GLOBULIN SER CALC-MCNC: 4.4 G/DL (ref 2–4)
GLUCOSE SERPL-MCNC: 121 MG/DL (ref 65–100)
HCG UR QL: NEGATIVE
HCT VFR BLD AUTO: 39.3 % (ref 35–47)
HGB BLD-MCNC: 13.4 G/DL (ref 11.5–16)
IMM GRANULOCYTES # BLD AUTO: 0 K/UL (ref 0–0.04)
IMM GRANULOCYTES NFR BLD AUTO: 0 % (ref 0–0.5)
LYMPHOCYTES # BLD: 2 K/UL (ref 0.8–3.5)
LYMPHOCYTES NFR BLD: 30 % (ref 12–49)
MCH RBC QN AUTO: 29.4 PG (ref 26–34)
MCHC RBC AUTO-ENTMCNC: 34.1 G/DL (ref 30–36.5)
MCV RBC AUTO: 86.2 FL (ref 80–99)
MONOCYTES # BLD: 0.4 K/UL (ref 0–1)
MONOCYTES NFR BLD: 5 % (ref 5–13)
NEUTS SEG # BLD: 4.1 K/UL (ref 1.8–8)
NEUTS SEG NFR BLD: 62 % (ref 32–75)
NRBC # BLD: 0 K/UL (ref 0–0.01)
NRBC BLD-RTO: 0 PER 100 WBC
PLATELET # BLD AUTO: 300 K/UL (ref 150–400)
PMV BLD AUTO: 9.6 FL (ref 8.9–12.9)
POTASSIUM SERPL-SCNC: 4.2 MMOL/L (ref 3.5–5.1)
PROT SERPL-MCNC: 8.2 G/DL (ref 6.4–8.2)
RBC # BLD AUTO: 4.56 M/UL (ref 3.8–5.2)
SODIUM SERPL-SCNC: 138 MMOL/L (ref 136–145)
WBC # BLD AUTO: 6.6 K/UL (ref 3.6–11)

## 2024-07-25 PROCEDURE — 2580000003 HC RX 258: Performed by: EMERGENCY MEDICINE

## 2024-07-25 PROCEDURE — 6360000002 HC RX W HCPCS: Performed by: EMERGENCY MEDICINE

## 2024-07-25 PROCEDURE — 81025 URINE PREGNANCY TEST: CPT

## 2024-07-25 PROCEDURE — 85025 COMPLETE CBC W/AUTO DIFF WBC: CPT

## 2024-07-25 PROCEDURE — 96375 TX/PRO/DX INJ NEW DRUG ADDON: CPT

## 2024-07-25 PROCEDURE — 70450 CT HEAD/BRAIN W/O DYE: CPT

## 2024-07-25 PROCEDURE — 80053 COMPREHEN METABOLIC PANEL: CPT

## 2024-07-25 PROCEDURE — 96374 THER/PROPH/DIAG INJ IV PUSH: CPT

## 2024-07-25 PROCEDURE — 99284 EMERGENCY DEPT VISIT MOD MDM: CPT

## 2024-07-25 PROCEDURE — 36415 COLL VENOUS BLD VENIPUNCTURE: CPT

## 2024-07-25 RX ORDER — DIPHENHYDRAMINE HYDROCHLORIDE 50 MG/ML
25 INJECTION INTRAMUSCULAR; INTRAVENOUS
Status: COMPLETED | OUTPATIENT
Start: 2024-07-25 | End: 2024-07-25

## 2024-07-25 RX ORDER — 0.9 % SODIUM CHLORIDE 0.9 %
1000 INTRAVENOUS SOLUTION INTRAVENOUS ONCE
Status: COMPLETED | OUTPATIENT
Start: 2024-07-25 | End: 2024-07-25

## 2024-07-25 RX ORDER — KETOROLAC TROMETHAMINE 30 MG/ML
30 INJECTION, SOLUTION INTRAMUSCULAR; INTRAVENOUS
Status: COMPLETED | OUTPATIENT
Start: 2024-07-25 | End: 2024-07-25

## 2024-07-25 RX ORDER — PROCHLORPERAZINE EDISYLATE 5 MG/ML
10 INJECTION INTRAMUSCULAR; INTRAVENOUS ONCE
Status: COMPLETED | OUTPATIENT
Start: 2024-07-25 | End: 2024-07-25

## 2024-07-25 RX ADMIN — DIPHENHYDRAMINE HYDROCHLORIDE 25 MG: 50 INJECTION INTRAMUSCULAR; INTRAVENOUS at 15:56

## 2024-07-25 RX ADMIN — PROCHLORPERAZINE EDISYLATE 10 MG: 5 INJECTION INTRAMUSCULAR; INTRAVENOUS at 15:58

## 2024-07-25 RX ADMIN — SODIUM CHLORIDE 1000 ML: 9 INJECTION, SOLUTION INTRAVENOUS at 15:56

## 2024-07-25 RX ADMIN — KETOROLAC TROMETHAMINE 30 MG: 30 INJECTION, SOLUTION INTRAMUSCULAR at 15:57

## 2024-07-25 NOTE — ED PROVIDER NOTES
Given 7/25/24 8070)       Medical Decision Making  Amount and/or Complexity of Data Reviewed  Labs: ordered.  Radiology: ordered.    Risk  Prescription drug management.              FINAL IMPRESSION     1. Concussion without loss of consciousness, subsequent encounter          DISPOSITION/PLAN   Madyson Mario's  results have been reviewed with her.  She has been counseled regarding her diagnosis, treatment, and plan.  She verbally conveys understanding and agreement of the signs, symptoms, diagnosis, treatment and prognosis and additionally agrees to follow up as discussed.  She also agrees with the care-plan and conveys that all of her questions have been answered.  I have also provided discharge instructions for her that include: educational information regarding their diagnosis and treatment, and list of reasons why they would want to return to the ED prior to their follow-up appointment, should her condition change.     CLINICAL IMPRESSION    Discharge Note: The patient is stable for discharge home. The signs, symptoms, diagnosis, and discharge instructions have been discussed, understanding conveyed, and agreed upon. The patient is to follow up as recommended or return to ER should their symptoms worsen.      PATIENT REFERRED TO:  Owen Santiago MD  [address] [facility] [address] [phone]      As needed    Charles Mark MD  [address] [address] [phone]      As needed    [facility] EMERGENCY DEPT  [address] [facility] [address] [phone]    If symptoms worsen       DISCHARGE MEDICATIONS:     Medication List        ASK your doctor about these medications      ibuprofen 600 MG tablet  Commonly known as: IBU  Take 1 tablet by mouth every 6 hours as needed for Pain                DISCONTINUED MEDICATIONS:  Current Discharge Medication List          I am the Primary Clinician of Record.   Cedric Hunter MD (electronically
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
## 2024-07-25 NOTE — ED NOTES
I have reviewed the provider's instructions with the patient, answering all questions to her satisfaction.     Pt ambulatory to waiting room with family.

## 2025-07-27 ENCOUNTER — TRANSCRIBE ORDERS (OUTPATIENT)
Facility: HOSPITAL | Age: 40
End: 2025-07-27

## 2025-07-27 DIAGNOSIS — E04.9 ENLARGEMENT OF THYROID: Primary | ICD-10-CM

## 2025-07-28 ENCOUNTER — TRANSCRIBE ORDERS (OUTPATIENT)
Facility: HOSPITAL | Age: 40
End: 2025-07-28

## 2025-07-28 DIAGNOSIS — E04.9 ENLARGED THYROID: Primary | ICD-10-CM

## 2025-08-04 ENCOUNTER — HOSPITAL ENCOUNTER (OUTPATIENT)
Facility: HOSPITAL | Age: 40
Discharge: HOME | End: 2025-08-07
Payer: COMMERCIAL

## 2025-08-04 DIAGNOSIS — E04.9 ENLARGED THYROID: ICD-10-CM

## 2025-08-04 PROCEDURE — 76536 US EXAM OF HEAD AND NECK: CPT
